# Patient Record
Sex: FEMALE | Race: WHITE | NOT HISPANIC OR LATINO | Employment: FULL TIME | URBAN - METROPOLITAN AREA
[De-identification: names, ages, dates, MRNs, and addresses within clinical notes are randomized per-mention and may not be internally consistent; named-entity substitution may affect disease eponyms.]

---

## 2017-12-26 ENCOUNTER — CONVERSION ENCOUNTER (OUTPATIENT)
Dept: OTHER | Facility: HOSPITAL | Age: 52
End: 2017-12-26

## 2018-03-26 ENCOUNTER — OFFICE VISIT CONVERTED (OUTPATIENT)
Dept: FAMILY MEDICINE CLINIC | Age: 53
End: 2018-03-26
Attending: NURSE PRACTITIONER

## 2018-05-03 ENCOUNTER — OFFICE VISIT CONVERTED (OUTPATIENT)
Dept: FAMILY MEDICINE CLINIC | Age: 53
End: 2018-05-03
Attending: FAMILY MEDICINE

## 2018-07-12 ENCOUNTER — OFFICE VISIT (OUTPATIENT)
Dept: ORTHOPEDIC SURGERY | Facility: CLINIC | Age: 53
End: 2018-07-12

## 2018-07-12 DIAGNOSIS — M25.562 LEFT KNEE PAIN, UNSPECIFIED CHRONICITY: Primary | ICD-10-CM

## 2018-07-12 DIAGNOSIS — M17.12 PRIMARY OSTEOARTHRITIS OF LEFT KNEE: ICD-10-CM

## 2018-07-12 PROCEDURE — 99203 OFFICE O/P NEW LOW 30 MIN: CPT | Performed by: ORTHOPAEDIC SURGERY

## 2018-07-12 RX ORDER — DICLOFENAC SODIUM 75 MG/1
TABLET, DELAYED RELEASE ORAL
COMMUNITY
Start: 2018-05-03 | End: 2021-06-09

## 2018-07-12 RX ORDER — HYDROCHLOROTHIAZIDE 25 MG/1
TABLET ORAL
COMMUNITY
Start: 2018-04-24 | End: 2021-06-09

## 2018-07-12 NOTE — PROGRESS NOTES
Chief Complaint   Patient presents with   • Left Knee - Establish Care   Patient is here today complaining of left knee pain for two months. There was no injury or trauma that she remembers.           HPI the patient has been having pain and discomfort on the medial aspect of the left knee for about 2 months.  There is no history of trauma.  Her symptoms are worst when she is climbing up and down the steps.  She has a dull aching sensation  which is associated with a click and a pop on the medial aspect of the knee.  Her symptoms are worse when she is walking briskly or on an inclined surface.  She states that occasionally the knee will buckle and give out from underneath her.  She has been using anti-inflammatory medication which is helpful in managing her symptoms of the knee pain to some degree.  She does have difficulty in squatting on the ground because of a sense of tightness of the knee.  There is no buckling or giving out of the knee at this point.          Allergies no known allergies      Social History     Social History   • Marital status:      Spouse name: N/A   • Number of children: N/A   • Years of education: N/A     Occupational History   • Not on file.     Social History Main Topics   • Smoking status: Not on file   • Smokeless tobacco: Not on file   • Alcohol use Not on file   • Drug use: Unknown   • Sexual activity: Not on file     Other Topics Concern   • Not on file     Social History Narrative   • No narrative on file       No family history on file.    No past surgical history on file.    No past medical history on file.        There were no vitals filed for this visit.          Review of Systems   Constitutional: Negative.    HENT: Negative.    Eyes: Negative.    Respiratory: Negative.    Cardiovascular: Negative.    Gastrointestinal: Negative.    Endocrine: Negative.    Genitourinary: Negative.    Musculoskeletal: Positive for gait problem and joint swelling.   Skin: Negative.     Allergic/Immunologic: Negative.    Hematological: Negative.            Physical Exam   Constitutional: She is oriented to person, place, and time. She appears well-nourished.   HENT:   Head: Atraumatic.   Eyes: EOM are normal.   Neck: Neck supple.   Cardiovascular: Intact distal pulses.    Pulmonary/Chest: Breath sounds normal.   Abdominal: Bowel sounds are normal.   Musculoskeletal: She exhibits edema and tenderness.   Neurological: She is alert and oriented to person, place, and time.   Skin: Skin is dry. Capillary refill takes 2 to 3 seconds.   Psychiatric: She has a normal mood and affect. Her behavior is normal. Judgment and thought content normal.   Nursing note and vitals reviewed.              Joint/Body Part Specific Exam:  left knee. Patient has crepitus throughout range of motion. Positive patellar grind test. Mild effusion. Lachman is negative. Pivot shift is negative. Anterior and posterior drawer signs are negative. Significant joint line tenderness is noted on the medial aspect of the knee. Patient has a varus orientation of the knee. There is fullness and tenderness in the Popliteal fossa. Mild distention of a Popliteal cyst is noted in this location. Range of motion in flexion is from 0- 120° degrees. Neurovascular status is intact.  Dorsalis pedis and posterior tibial artery pulses are palpable. Common peroneal nerve function is well preserved. Patient's gait is cautious and antalgic. Skin and soft tissues are mildly swollen, consistent with synovitis and effusion. The patient has a significant limp with the first few steps after starting the gait cycle. Getting out of a chair takes a lot of effort due to pain on knee flexion.          X-RAY Report:  X-rays of the knee AP and lateral images were reviewed.  There is a significant narrowing of the medial joint space.  Osteophyte formation is noted.  The findings do not reflect bone on bone appearance just yet.  There is some decrease of the  patellofemoral distance is well          Diagnostics: MRI reports are discussed with the patient.  These reports are available from the hospital and the images show degenerative change involving all the 3 compartments of the knee.  There is chondromalacia of the lateral side greater than the medial side of the patella.  There is some meniscal degeneration but no tears.  The ACL and the MCL are intact.  The quad and the patellar tendons are intact as well.  There is a small joint effusion.  Based on these MRI images by recommendations are for nonoperative care for this patient at this point in time.            Iman was seen today for establish care.    Diagnoses and all orders for this visit:    Left knee pain, unspecified chronicity  -     Ambulatory Referral to Physical Therapy Evaluate and treat    Primary osteoarthritis of left knee            Procedures          I provided this patient with educational materials regarding bone health and cast or splint care.        Plan:   Use a supportive brace on the knee to prevent it from buckling and giving out.    Tablet ibuprofen 600 mg orally twice a day for pain swelling and discomfort.    Calcium and vitamin D for bone health.    Intra-articular steroid injection and Synvisc Visco supplementation discussed with the patient at length and she understands and will let me know if she would like to proceed with that modality of care.    Note for outpatient physical therapy given to the patient to work on her quads and her hamstring muscles.    Use diclofenac 75 mg tab 1 by mouth twice a day for pain swelling and discomfort.    There is no indication for surgical intervention at this point because she has degenerative changes in the meniscus but not a tear.  She is also too young to consider knee replacement surgery at this point and therefore nonoperative management has been discussed with the patient in detail.    Follow-up in my office in 3 months for  reevaluation.        CC To Tiffani Arango MD

## 2018-07-22 PROBLEM — M25.562 LEFT KNEE PAIN: Status: ACTIVE | Noted: 2018-07-22

## 2019-01-07 ENCOUNTER — OFFICE VISIT CONVERTED (OUTPATIENT)
Dept: FAMILY MEDICINE CLINIC | Age: 54
End: 2019-01-07
Attending: NURSE PRACTITIONER

## 2019-01-21 ENCOUNTER — HOSPITAL ENCOUNTER (OUTPATIENT)
Dept: OTHER | Facility: HOSPITAL | Age: 54
Discharge: HOME | End: 2019-01-21
Attending: NURSE PRACTITIONER

## 2019-01-21 LAB
25(OH)D3 SERPL-MCNC: 32.9 NG/ML (ref 30–100)
ALBUMIN SERPL-MCNC: 3.5 G/DL (ref 3.5–5)
ALBUMIN/GLOB SERPL: 0.8 {RATIO} (ref 1.4–2.6)
ALP SERPL-CCNC: 105 U/L (ref 53–141)
ALT SERPL-CCNC: 14 U/L (ref 10–40)
ANION GAP SERPL CALC-SCNC: 15 MMOL/L (ref 8–19)
AST SERPL-CCNC: 15 U/L (ref 15–50)
BASOPHILS # BLD MANUAL: 0.05 10*3/UL (ref 0–0.2)
BASOPHILS NFR BLD MANUAL: 0.8 % (ref 0–3)
BILIRUB SERPL-MCNC: 0.54 MG/DL (ref 0.2–1.3)
BUN SERPL-MCNC: 14 MG/DL (ref 5–25)
BUN/CREAT SERPL: 15 {RATIO} (ref 6–20)
CALCIUM SERPL-MCNC: 9.6 MG/DL (ref 8.7–10.4)
CHLORIDE SERPL-SCNC: 101 MMOL/L (ref 99–111)
CHOLEST SERPL-MCNC: 157 MG/DL (ref 107–200)
CHOLEST/HDLC SERPL: 3.3 {RATIO} (ref 3–6)
CONV CO2: 27 MMOL/L (ref 22–32)
CONV TOTAL PROTEIN: 7.8 G/DL (ref 6.3–8.2)
CREAT UR-MCNC: 0.91 MG/DL (ref 0.5–0.9)
DEPRECATED RDW RBC AUTO: 41.6 FL
EOSINOPHIL # BLD MANUAL: 0.14 10*3/UL (ref 0–0.7)
EOSINOPHIL NFR BLD MANUAL: 2.3 % (ref 0–7)
ERYTHROCYTE [DISTWIDTH] IN BLOOD BY AUTOMATED COUNT: 13 % (ref 11.5–14.5)
GFR SERPLBLD BASED ON 1.73 SQ M-ARVRAT: >60 ML/MIN/{1.73_M2}
GLOBULIN UR ELPH-MCNC: 4.3 G/DL (ref 2–3.5)
GLUCOSE SERPL-MCNC: 85 MG/DL (ref 65–99)
GRANS (ABSOLUTE): 3.93 10*3/UL (ref 2–8)
GRANS: 63.5 % (ref 30–85)
HBA1C MFR BLD: 11.5 G/DL (ref 12–16)
HCT VFR BLD AUTO: 36.2 % (ref 37–47)
HDLC SERPL-MCNC: 48 MG/DL (ref 40–60)
IMM GRANULOCYTES # BLD: 0.01 10*3/UL (ref 0–0.54)
IMM GRANULOCYTES NFR BLD: 0.2 % (ref 0–0.43)
IRON SATN MFR SERPL: 19 % (ref 20–55)
IRON SERPL-MCNC: 66 UG/DL (ref 60–170)
LDLC SERPL CALC-MCNC: 91 MG/DL (ref 70–100)
LYMPHOCYTES # BLD MANUAL: 1.75 10*3/UL (ref 1–5)
LYMPHOCYTES NFR BLD MANUAL: 4.9 % (ref 3–10)
MCH RBC QN AUTO: 27.7 PG (ref 27–31)
MCHC RBC AUTO-ENTMCNC: 31.8 G/DL (ref 33–37)
MCV RBC AUTO: 87.2 FL (ref 81–99)
MONOCYTES # BLD AUTO: 0.3 10*3/UL (ref 0.2–1.2)
OSMOLALITY SERPL CALC.SUM OF ELEC: 288 MOSM/KG (ref 273–304)
PLATELET # BLD AUTO: 241 10*3/UL (ref 130–400)
PMV BLD AUTO: 9.8 FL (ref 7.4–10.4)
POTASSIUM SERPL-SCNC: 3.7 MMOL/L (ref 3.5–5.3)
RBC # BLD AUTO: 4.15 10*6/UL (ref 4.2–5.4)
SODIUM SERPL-SCNC: 139 MMOL/L (ref 135–147)
TIBC SERPL-MCNC: 343 UG/DL (ref 245–450)
TRANSFERRIN SERPL-MCNC: 240 MG/DL (ref 250–380)
TRIGL SERPL-MCNC: 90 MG/DL (ref 40–150)
TSH SERPL-ACNC: 2.02 M[IU]/L (ref 0.27–4.2)
VARIANT LYMPHS NFR BLD MANUAL: 28.3 % (ref 20–45)
VIT B12 SERPL-MCNC: 260 PG/ML (ref 211–911)
VLDLC SERPL-MCNC: 18 MG/DL (ref 5–37)
WBC # BLD AUTO: 6.18 10*3/UL (ref 4.8–10.8)

## 2019-10-21 ENCOUNTER — OFFICE VISIT CONVERTED (OUTPATIENT)
Dept: FAMILY MEDICINE CLINIC | Age: 54
End: 2019-10-21
Attending: NURSE PRACTITIONER

## 2019-12-11 ENCOUNTER — OFFICE VISIT CONVERTED (OUTPATIENT)
Dept: FAMILY MEDICINE CLINIC | Age: 54
End: 2019-12-11
Attending: NURSE PRACTITIONER

## 2019-12-11 ENCOUNTER — HOSPITAL ENCOUNTER (OUTPATIENT)
Dept: OTHER | Facility: HOSPITAL | Age: 54
Discharge: HOME | End: 2019-12-11
Attending: NURSE PRACTITIONER

## 2019-12-11 ENCOUNTER — HOSPITAL ENCOUNTER (OUTPATIENT)
Dept: INFUSION THERAPY | Facility: HOSPITAL | Age: 54
Setting detail: RECURRING SERIES
Discharge: HOME | End: 2019-12-31
Attending: OBSTETRICS & GYNECOLOGY

## 2019-12-11 LAB
ANION GAP SERPL CALC-SCNC: 20 MMOL/L (ref 8–19)
BASOPHILS # BLD AUTO: 0.06 10*3/UL (ref 0–0.2)
BASOPHILS NFR BLD AUTO: 0.6 % (ref 0–3)
BUN SERPL-MCNC: 16 MG/DL (ref 5–25)
BUN/CREAT SERPL: 8 {RATIO} (ref 6–20)
CALCIUM SERPL-MCNC: 9.7 MG/DL (ref 8.7–10.4)
CHLORIDE SERPL-SCNC: 100 MMOL/L (ref 99–111)
CONV ABS IMM GRAN: 0.15 10*3/UL (ref 0–0.2)
CONV CO2: 23 MMOL/L (ref 22–32)
CONV IMMATURE GRAN: 1.4 % (ref 0–1.8)
CREAT UR-MCNC: 1.99 MG/DL (ref 0.5–0.9)
DEPRECATED RDW RBC AUTO: 42.2 FL (ref 36.4–46.3)
EOSINOPHIL # BLD AUTO: 0.21 10*3/UL (ref 0–0.7)
EOSINOPHIL # BLD AUTO: 1.9 % (ref 0–7)
ERYTHROCYTE [DISTWIDTH] IN BLOOD BY AUTOMATED COUNT: 12.5 % (ref 11.7–14.4)
GFR SERPLBLD BASED ON 1.73 SQ M-ARVRAT: 32 ML/MIN/{1.73_M2}
GLUCOSE SERPL-MCNC: 104 MG/DL (ref 65–99)
HCT VFR BLD AUTO: 29.9 % (ref 37–47)
HGB BLD-MCNC: 9.3 G/DL (ref 12–16)
LYMPHOCYTES # BLD AUTO: 1.16 10*3/UL (ref 1–5)
LYMPHOCYTES NFR BLD AUTO: 10.7 % (ref 20–45)
MCH RBC QN AUTO: 28.9 PG (ref 27–31)
MCHC RBC AUTO-ENTMCNC: 31.1 G/DL (ref 33–37)
MCV RBC AUTO: 92.9 FL (ref 81–99)
MONOCYTES # BLD AUTO: 0.54 10*3/UL (ref 0.2–1.2)
MONOCYTES NFR BLD AUTO: 5 % (ref 3–10)
NEUTROPHILS # BLD AUTO: 8.73 10*3/UL (ref 2–8)
NEUTROPHILS NFR BLD AUTO: 80.4 % (ref 30–85)
NRBC CBCN: 0 % (ref 0–0.7)
OSMOLALITY SERPL CALC.SUM OF ELEC: 289 MOSM/KG (ref 273–304)
PLATELET # BLD AUTO: 520 10*3/UL (ref 130–400)
PMV BLD AUTO: 10 FL (ref 9.4–12.3)
POTASSIUM SERPL-SCNC: 4.2 MMOL/L (ref 3.5–5.3)
RBC # BLD AUTO: 3.22 10*6/UL (ref 4.2–5.4)
SODIUM SERPL-SCNC: 139 MMOL/L (ref 135–147)
WBC # BLD AUTO: 10.85 10*3/UL (ref 4.8–10.8)

## 2019-12-12 ENCOUNTER — HOSPITAL ENCOUNTER (OUTPATIENT)
Dept: OTHER | Facility: HOSPITAL | Age: 54
Discharge: HOME | End: 2019-12-12
Attending: NURSE PRACTITIONER

## 2019-12-12 LAB
D DIMER PPP FEU-MCNC: 1.37 MG/L (ref 0–0.59)
IRON SATN MFR SERPL: 12 % (ref 20–55)
IRON SERPL-MCNC: 26 UG/DL (ref 60–170)
TIBC SERPL-MCNC: 219 UG/DL (ref 245–450)
TRANSFERRIN SERPL-MCNC: 153 MG/DL (ref 250–380)

## 2019-12-13 ENCOUNTER — HOSPITAL ENCOUNTER (OUTPATIENT)
Dept: GENERAL RADIOLOGY | Facility: HOSPITAL | Age: 54
Discharge: HOME | End: 2019-12-13
Attending: NURSE PRACTITIONER

## 2019-12-13 LAB
CREAT BLD-MCNC: 2 MG/DL (ref 0.6–1.4)
GFR SERPLBLD BASED ON 1.73 SQ M-ARVRAT: 32 ML/MIN/{1.73_M2}

## 2019-12-19 ENCOUNTER — OFFICE VISIT CONVERTED (OUTPATIENT)
Dept: FAMILY MEDICINE CLINIC | Age: 54
End: 2019-12-19
Attending: FAMILY MEDICINE

## 2019-12-19 ENCOUNTER — HOSPITAL ENCOUNTER (OUTPATIENT)
Dept: OTHER | Facility: HOSPITAL | Age: 54
Discharge: HOME | End: 2019-12-19
Attending: FAMILY MEDICINE

## 2019-12-19 LAB
ANION GAP SERPL CALC-SCNC: 22 MMOL/L (ref 8–19)
BUN SERPL-MCNC: 31 MG/DL (ref 5–25)
BUN/CREAT SERPL: 17 {RATIO} (ref 6–20)
CALCIUM SERPL-MCNC: 10.1 MG/DL (ref 8.7–10.4)
CHLORIDE SERPL-SCNC: 97 MMOL/L (ref 99–111)
CONV CO2: 23 MMOL/L (ref 22–32)
CREAT UR-MCNC: 1.85 MG/DL (ref 0.5–0.9)
ERYTHROCYTE [DISTWIDTH] IN BLOOD BY AUTOMATED COUNT: 12.7 % (ref 11.7–14.4)
GFR SERPLBLD BASED ON 1.73 SQ M-ARVRAT: 35 ML/MIN/{1.73_M2}
GLUCOSE SERPL-MCNC: 97 MG/DL (ref 65–99)
HCT VFR BLD AUTO: 32.4 % (ref 37–47)
HGB BLD-MCNC: 10.2 G/DL (ref 12–16)
MCH RBC QN AUTO: 28.7 PG (ref 27–31)
MCHC RBC AUTO-ENTMCNC: 31.5 G/DL (ref 33–37)
MCV RBC AUTO: 91 FL (ref 81–99)
OSMOLALITY SERPL CALC.SUM OF ELEC: 292 MOSM/KG (ref 273–304)
PLATELET # BLD AUTO: 534 10*3/UL (ref 130–400)
PMV BLD AUTO: 9.3 FL (ref 9.4–12.3)
POTASSIUM SERPL-SCNC: 3.5 MMOL/L (ref 3.5–5.3)
RBC # BLD AUTO: 3.56 10*6/UL (ref 4.2–5.4)
SODIUM SERPL-SCNC: 138 MMOL/L (ref 135–147)
WBC # BLD AUTO: 11.76 10*3/UL (ref 4.8–10.8)

## 2019-12-23 ENCOUNTER — HOSPITAL ENCOUNTER (OUTPATIENT)
Dept: OTHER | Facility: HOSPITAL | Age: 54
Discharge: HOME | End: 2019-12-23
Attending: FAMILY MEDICINE

## 2019-12-23 LAB
APPEARANCE UR: ABNORMAL
BACTERIA UR CULT: NORMAL
BACTERIA UR QL AUTO: ABNORMAL
BILIRUB UR QL: NEGATIVE
CASTS URNS QL MICRO: ABNORMAL /[LPF]
COLOR UR: YELLOW
CONV LEUKOCYTE ESTERASE: ABNORMAL
CONV UROBILINOGEN IN URINE BY AUTOMATED TEST STRIP: 0.2 {EHRLICHU}/DL (ref 0.1–1)
EPI CELLS #/AREA URNS HPF: ABNORMAL /[HPF]
ERYTHROCYTE [DISTWIDTH] IN BLOOD BY AUTOMATED COUNT: 12.7 % (ref 11.5–14.5)
GLUCOSE 24H UR-MCNC: NEGATIVE MG/DL
HBA1C MFR BLD: 10 G/DL (ref 12–16)
HCT VFR BLD AUTO: 31.2 % (ref 37–47)
HGB UR QL STRIP: ABNORMAL
KETONES UR QL STRIP: NEGATIVE MG/DL
MCH RBC QN AUTO: 28.8 PG (ref 27–31)
MCHC RBC AUTO-ENTMCNC: 32.1 G/DL (ref 33–37)
MCV RBC AUTO: 89.9 FL (ref 81–99)
MUCOUS THREADS URNS QL MICRO: ABNORMAL
NITRITE UR-MCNC: NEGATIVE MG/ML
PH UR STRIP.AUTO: 5.5 [PH] (ref 5–8)
PLATELET # BLD AUTO: 437 10*3/UL (ref 130–400)
PMV BLD AUTO: 9.6 FL (ref 7.4–10.4)
PROT UR-MCNC: NEGATIVE MG/DL
RBC # BLD AUTO: 3.47 10*6/UL (ref 4.2–5.4)
RBC # BLD AUTO: ABNORMAL /[HPF]
SP GR UR STRIP: 1.01 (ref 1–1.03)
SPECIMEN SOURCE: ABNORMAL
UNIDENT CRYS URNS QL MICRO: ABNORMAL /[HPF]
WBC # BLD AUTO: 12.56 10*3/UL (ref 4.8–10.8)
WBC #/AREA URNS HPF: ABNORMAL /[HPF]

## 2019-12-25 LAB
AMOXICILLIN+CLAV SUSC ISLT: 8
AMPICILLIN SUSC ISLT: >=32
AMPICILLIN+SULBAC SUSC ISLT: >=32
BACTERIA UR CULT: ABNORMAL
CEFAZOLIN SUSC ISLT: <=4
CEFEPIME SUSC ISLT: <=1
CEFTAZIDIME SUSC ISLT: <=1
CEFTRIAXONE SUSC ISLT: <=1
CEFUROXIME ORAL SUSC ISLT: 32
CEFUROXIME PARENTER SUSC ISLT: 32
CIPROFLOXACIN SUSC ISLT: <=0.25
ERTAPENEM SUSC ISLT: <=0.5
GENTAMICIN SUSC ISLT: <=1
LEVOFLOXACIN SUSC ISLT: 1
NITROFURANTOIN SUSC ISLT: 128
TETRACYCLINE SUSC ISLT: >=16
TMP SMX SUSC ISLT: <=20
TOBRAMYCIN SUSC ISLT: <=1

## 2020-01-01 ENCOUNTER — HOSPITAL ENCOUNTER (OUTPATIENT)
Dept: INFUSION THERAPY | Facility: HOSPITAL | Age: 55
Setting detail: RECURRING SERIES
Discharge: HOME | End: 2020-01-31
Attending: OBSTETRICS & GYNECOLOGY

## 2020-01-08 ENCOUNTER — TRANSCRIBE ORDERS (OUTPATIENT)
Dept: ADMINISTRATIVE | Facility: HOSPITAL | Age: 55
End: 2020-01-08

## 2020-01-08 DIAGNOSIS — R91.1 LUNG NODULE: Primary | ICD-10-CM

## 2020-01-14 ENCOUNTER — HOSPITAL ENCOUNTER (OUTPATIENT)
Dept: PET IMAGING | Facility: HOSPITAL | Age: 55
Discharge: HOME | End: 2020-01-14
Admitting: INTERNAL MEDICINE

## 2020-01-14 ENCOUNTER — HOSPITAL ENCOUNTER (OUTPATIENT)
Dept: PET IMAGING | Facility: HOSPITAL | Age: 55
Discharge: HOME | End: 2020-01-14

## 2020-01-14 DIAGNOSIS — R91.1 LUNG NODULE: ICD-10-CM

## 2020-01-14 LAB — GLUCOSE BLDC GLUCOMTR-MCNC: 112 MG/DL (ref 70–130)

## 2020-01-14 PROCEDURE — A9552 F18 FDG: HCPCS | Performed by: INTERNAL MEDICINE

## 2020-01-14 PROCEDURE — 78815 PET IMAGE W/CT SKULL-THIGH: CPT

## 2020-01-14 PROCEDURE — 0 FLUDEOXYGLUCOSE F18 SOLUTION: Performed by: INTERNAL MEDICINE

## 2020-01-14 PROCEDURE — 82962 GLUCOSE BLOOD TEST: CPT

## 2020-01-14 RX ADMIN — FLUDEOXYGLUCOSE F18 1 DOSE: 300 INJECTION INTRAVENOUS at 07:15

## 2020-01-30 ENCOUNTER — HOSPITAL ENCOUNTER (OUTPATIENT)
Dept: OTHER | Facility: HOSPITAL | Age: 55
Discharge: HOME | End: 2020-01-30
Attending: FAMILY MEDICINE

## 2020-02-11 ENCOUNTER — OFFICE VISIT CONVERTED (OUTPATIENT)
Dept: FAMILY MEDICINE CLINIC | Age: 55
End: 2020-02-11
Attending: FAMILY MEDICINE

## 2020-04-03 ENCOUNTER — OFFICE VISIT CONVERTED (OUTPATIENT)
Dept: FAMILY MEDICINE CLINIC | Age: 55
End: 2020-04-03
Attending: FAMILY MEDICINE

## 2020-04-04 ENCOUNTER — HOSPITAL ENCOUNTER (OUTPATIENT)
Dept: OTHER | Facility: HOSPITAL | Age: 55
Discharge: HOME | End: 2020-04-04
Attending: INTERNAL MEDICINE

## 2020-04-04 LAB
ALBUMIN SERPL-MCNC: 3.8 G/DL (ref 3.5–5)
ANION GAP SERPL CALC-SCNC: 15 MMOL/L (ref 8–19)
APPEARANCE UR: CLEAR
BACTERIA UR QL AUTO: ABNORMAL
BILIRUB UR QL: NEGATIVE
BUN SERPL-MCNC: 13 MG/DL (ref 5–25)
BUN/CREAT SERPL: 13 {RATIO} (ref 6–20)
CALCIUM SERPL-MCNC: 9.9 MG/DL (ref 8.7–10.4)
CASTS URNS QL MICRO: ABNORMAL /[LPF]
CHLORIDE SERPL-SCNC: 103 MMOL/L (ref 99–111)
COLOR UR: ABNORMAL
CONV CO2: 28 MMOL/L (ref 22–32)
CONV CREATININE URINE, RANDOM: 249 MG/DL (ref 10–300)
CONV LEUKOCYTE ESTERASE: NEGATIVE
CONV PROTEIN TO CREATININE RATIO (RANDOM URINE): 0.06 {RATIO} (ref 0–0.1)
CONV UROBILINOGEN IN URINE BY AUTOMATED TEST STRIP: 0.2 {EHRLICHU}/DL (ref 0.1–1)
CREAT UR-MCNC: 1.04 MG/DL (ref 0.5–0.9)
EPI CELLS #/AREA URNS HPF: ABNORMAL /[HPF]
ERYTHROCYTE [DISTWIDTH] IN BLOOD BY AUTOMATED COUNT: 12.9 % (ref 11.5–14.5)
GFR SERPLBLD BASED ON 1.73 SQ M-ARVRAT: >60 ML/MIN/{1.73_M2}
GLUCOSE 24H UR-MCNC: NEGATIVE MG/DL
GLUCOSE SERPL-MCNC: 113 MG/DL (ref 65–99)
HBA1C MFR BLD: 12.8 G/DL (ref 12–16)
HCT VFR BLD AUTO: 39.6 % (ref 37–47)
HGB UR QL STRIP: NEGATIVE
KETONES UR QL STRIP: NEGATIVE MG/DL
MCH RBC QN AUTO: 28.8 PG (ref 27–31)
MCHC RBC AUTO-ENTMCNC: 32.3 G/DL (ref 33–37)
MCV RBC AUTO: 89.2 FL (ref 81–99)
MUCOUS THREADS URNS QL MICRO: ABNORMAL
NITRITE UR-MCNC: NEGATIVE MG/ML
PH UR STRIP.AUTO: 6 [PH] (ref 5–8)
PHOSPHATE SERPL-MCNC: 2.7 MG/DL (ref 2.4–4.5)
PLATELET # BLD AUTO: 265 10*3/UL (ref 130–400)
PMV BLD AUTO: 9.8 FL (ref 7.4–10.4)
POTASSIUM SERPL-SCNC: 4.1 MMOL/L (ref 3.5–5.3)
PROT UR-MCNC: 15.5 MG/DL
PROT UR-MCNC: NEGATIVE MG/DL
RBC # BLD AUTO: 4.44 10*6/UL (ref 4.2–5.4)
RBC # BLD AUTO: ABNORMAL /[HPF]
SODIUM SERPL-SCNC: 142 MMOL/L (ref 135–147)
SP GR UR STRIP: 1.02 (ref 1–1.03)
SPECIMEN SOURCE: ABNORMAL
UNIDENT CRYS URNS QL MICRO: ABNORMAL /[HPF]
WBC # BLD AUTO: 8.89 10*3/UL (ref 4.8–10.8)
WBC #/AREA URNS HPF: ABNORMAL /[HPF]

## 2020-05-11 ENCOUNTER — OFFICE VISIT CONVERTED (OUTPATIENT)
Dept: FAMILY MEDICINE CLINIC | Age: 55
End: 2020-05-11
Attending: FAMILY MEDICINE

## 2020-05-23 ENCOUNTER — HOSPITAL ENCOUNTER (OUTPATIENT)
Dept: OTHER | Facility: HOSPITAL | Age: 55
Discharge: HOME | End: 2020-05-23
Attending: FAMILY MEDICINE

## 2020-05-23 LAB
25(OH)D3 SERPL-MCNC: 33.3 NG/ML (ref 30–100)
ANION GAP SERPL CALC-SCNC: 13 MMOL/L (ref 8–19)
BNP SERPL-MCNC: 15 PG/ML (ref 0–900)
BUN SERPL-MCNC: 22 MG/DL (ref 5–25)
BUN/CREAT SERPL: 23 {RATIO} (ref 6–20)
CALCIUM SERPL-MCNC: 9.6 MG/DL (ref 8.7–10.4)
CHLORIDE SERPL-SCNC: 100 MMOL/L (ref 99–111)
CHOLEST SERPL-MCNC: 146 MG/DL (ref 107–200)
CHOLEST/HDLC SERPL: 3.2 {RATIO} (ref 3–6)
CONV CO2: 29 MMOL/L (ref 22–32)
CREAT UR-MCNC: 0.96 MG/DL (ref 0.5–0.9)
EST. AVERAGE GLUCOSE BLD GHB EST-MCNC: 108 MG/DL
GFR SERPLBLD BASED ON 1.73 SQ M-ARVRAT: >60 ML/MIN/{1.73_M2}
GLUCOSE SERPL-MCNC: 113 MG/DL (ref 65–99)
HBA1C MFR BLD: 5.4 % (ref 3.5–5.7)
HDLC SERPL-MCNC: 46 MG/DL (ref 40–60)
LDLC SERPL CALC-MCNC: 85 MG/DL (ref 70–100)
OSMOLALITY SERPL CALC.SUM OF ELEC: 292 MOSM/KG (ref 273–304)
POTASSIUM SERPL-SCNC: 3.4 MMOL/L (ref 3.5–5.3)
SODIUM SERPL-SCNC: 139 MMOL/L (ref 135–147)
T4 FREE SERPL-MCNC: 1.2 NG/DL (ref 0.9–1.8)
TRIGL SERPL-MCNC: 73 MG/DL (ref 40–150)
TSH SERPL-ACNC: 2.07 M[IU]/L (ref 0.27–4.2)
VLDLC SERPL-MCNC: 15 MG/DL (ref 5–37)

## 2020-05-27 ENCOUNTER — OFFICE VISIT CONVERTED (OUTPATIENT)
Dept: FAMILY MEDICINE CLINIC | Age: 55
End: 2020-05-27
Attending: FAMILY MEDICINE

## 2020-06-19 ENCOUNTER — HOSPITAL ENCOUNTER (OUTPATIENT)
Dept: PHYSICAL THERAPY | Facility: CLINIC | Age: 55
Setting detail: RECURRING SERIES
Discharge: HOME | End: 2020-09-14
Attending: FAMILY MEDICINE

## 2020-09-01 ENCOUNTER — HOSPITAL ENCOUNTER (OUTPATIENT)
Dept: OTHER | Facility: HOSPITAL | Age: 55
Discharge: HOME | End: 2020-09-01
Attending: FAMILY MEDICINE

## 2020-09-01 ENCOUNTER — OFFICE VISIT CONVERTED (OUTPATIENT)
Dept: FAMILY MEDICINE CLINIC | Age: 55
End: 2020-09-01
Attending: FAMILY MEDICINE

## 2020-09-01 LAB
ERYTHROCYTE [DISTWIDTH] IN BLOOD BY AUTOMATED COUNT: 12.5 % (ref 11.5–14.5)
HBA1C MFR BLD: 13.3 G/DL (ref 12–16)
HCT VFR BLD AUTO: 41.1 % (ref 37–47)
MCH RBC QN AUTO: 29.6 PG (ref 27–31)
MCHC RBC AUTO-ENTMCNC: 32.4 G/DL (ref 33–37)
MCV RBC AUTO: 91.3 FL (ref 81–99)
PLATELET # BLD AUTO: 268 10*3/UL (ref 130–400)
PMV BLD AUTO: 10.1 FL (ref 7.4–10.4)
RBC # BLD AUTO: 4.5 10*6/UL (ref 4.2–5.4)
VIT B12 SERPL-MCNC: 522 PG/ML (ref 211–911)
WBC # BLD AUTO: 7.54 10*3/UL (ref 4.8–10.8)

## 2020-09-02 LAB — IRON SERPL-MCNC: 65 UG/DL (ref 60–170)

## 2020-09-04 LAB — CONV GLIADIN PEPTIDE AB IGA: 5 UNITS (ref 0–19)

## 2020-12-01 ENCOUNTER — HOSPITAL ENCOUNTER (OUTPATIENT)
Dept: OTHER | Facility: HOSPITAL | Age: 55
Discharge: HOME | End: 2020-12-01
Attending: FAMILY MEDICINE

## 2020-12-01 ENCOUNTER — OFFICE VISIT CONVERTED (OUTPATIENT)
Dept: FAMILY MEDICINE CLINIC | Age: 55
End: 2020-12-01
Attending: FAMILY MEDICINE

## 2020-12-01 LAB
25(OH)D3 SERPL-MCNC: 31.7 NG/ML (ref 30–100)
ALBUMIN SERPL-MCNC: 4 G/DL (ref 3.5–5)
ALBUMIN/GLOB SERPL: 1.1 {RATIO} (ref 1.4–2.6)
ALP SERPL-CCNC: 117 U/L (ref 53–141)
ALT SERPL-CCNC: 28 U/L (ref 10–40)
ANION GAP SERPL CALC-SCNC: 14 MMOL/L (ref 8–19)
AST SERPL-CCNC: 25 U/L (ref 15–50)
BILIRUB SERPL-MCNC: 0.39 MG/DL (ref 0.2–1.3)
BUN SERPL-MCNC: 18 MG/DL (ref 5–25)
BUN/CREAT SERPL: 15 {RATIO} (ref 6–20)
CALCIUM SERPL-MCNC: 9.7 MG/DL (ref 8.7–10.4)
CHLORIDE SERPL-SCNC: 102 MMOL/L (ref 99–111)
CHOLEST SERPL-MCNC: 174 MG/DL (ref 107–200)
CHOLEST/HDLC SERPL: 3.6 {RATIO} (ref 3–6)
CONV CO2: 28 MMOL/L (ref 22–32)
CONV TOTAL PROTEIN: 7.7 G/DL (ref 6.3–8.2)
CREAT UR-MCNC: 1.24 MG/DL (ref 0.5–0.9)
ERYTHROCYTE [DISTWIDTH] IN BLOOD BY AUTOMATED COUNT: 12.5 % (ref 11.5–14.5)
EST. AVERAGE GLUCOSE BLD GHB EST-MCNC: 120 MG/DL
GFR SERPLBLD BASED ON 1.73 SQ M-ARVRAT: 56 ML/MIN/{1.73_M2}
GLOBULIN UR ELPH-MCNC: 3.7 G/DL (ref 2–3.5)
GLUCOSE SERPL-MCNC: 97 MG/DL (ref 65–99)
HBA1C MFR BLD: 13.2 G/DL (ref 12–16)
HBA1C MFR BLD: 5.8 % (ref 3.5–5.7)
HCT VFR BLD AUTO: 40.4 % (ref 37–47)
HDLC SERPL-MCNC: 49 MG/DL (ref 40–60)
IRON SERPL-MCNC: 63 UG/DL (ref 60–170)
LDLC SERPL CALC-MCNC: 103 MG/DL (ref 70–100)
MCH RBC QN AUTO: 30 PG (ref 27–31)
MCHC RBC AUTO-ENTMCNC: 32.7 G/DL (ref 33–37)
MCV RBC AUTO: 91.8 FL (ref 81–99)
OSMOLALITY SERPL CALC.SUM OF ELEC: 292 MOSM/KG (ref 273–304)
PLATELET # BLD AUTO: 276 10*3/UL (ref 130–400)
PMV BLD AUTO: 9.4 FL (ref 7.4–10.4)
POTASSIUM SERPL-SCNC: 4.2 MMOL/L (ref 3.5–5.3)
RBC # BLD AUTO: 4.4 10*6/UL (ref 4.2–5.4)
SODIUM SERPL-SCNC: 140 MMOL/L (ref 135–147)
TRIGL SERPL-MCNC: 112 MG/DL (ref 40–150)
VIT B12 SERPL-MCNC: 538 PG/ML (ref 211–911)
VLDLC SERPL-MCNC: 22 MG/DL (ref 5–37)
WBC # BLD AUTO: 7.93 10*3/UL (ref 4.8–10.8)

## 2021-03-25 ENCOUNTER — HOSPITAL ENCOUNTER (OUTPATIENT)
Dept: OTHER | Facility: HOSPITAL | Age: 56
Discharge: HOME | End: 2021-03-25
Attending: FAMILY MEDICINE

## 2021-04-09 ENCOUNTER — HOSPITAL ENCOUNTER (OUTPATIENT)
Dept: OTHER | Facility: HOSPITAL | Age: 56
Discharge: HOME | End: 2021-04-09
Attending: INTERNAL MEDICINE

## 2021-04-09 LAB
ALBUMIN SERPL-MCNC: 4 G/DL (ref 3.5–5)
ANION GAP SERPL CALC-SCNC: 18 MMOL/L (ref 8–19)
APPEARANCE UR: ABNORMAL
BACTERIA UR QL AUTO: ABNORMAL
BILIRUB UR QL: NEGATIVE
BUN SERPL-MCNC: 22 MG/DL (ref 5–25)
BUN/CREAT SERPL: 18 {RATIO} (ref 6–20)
CALCIUM SERPL-MCNC: 9.7 MG/DL (ref 8.7–10.4)
CASTS URNS QL MICRO: ABNORMAL /[LPF]
CHLORIDE SERPL-SCNC: 103 MMOL/L (ref 99–111)
COLOR UR: YELLOW
CONV CO2: 25 MMOL/L (ref 22–32)
CONV CREATININE URINE, RANDOM: 192.6 MG/DL (ref 10–300)
CONV LEUKOCYTE ESTERASE: NEGATIVE
CONV PROTEIN TO CREATININE RATIO (RANDOM URINE): 0.04 {RATIO} (ref 0–0.1)
CONV UROBILINOGEN IN URINE BY AUTOMATED TEST STRIP: 0.2 {EHRLICHU}/DL (ref 0.1–1)
CREAT UR-MCNC: 1.25 MG/DL (ref 0.5–0.9)
EPI CELLS #/AREA URNS HPF: ABNORMAL /[HPF]
ERYTHROCYTE [DISTWIDTH] IN BLOOD BY AUTOMATED COUNT: 12.7 % (ref 11.5–14.5)
GFR SERPLBLD BASED ON 1.73 SQ M-ARVRAT: 56 ML/MIN/{1.73_M2}
GLUCOSE 24H UR-MCNC: NEGATIVE MG/DL
GLUCOSE SERPL-MCNC: 122 MG/DL (ref 65–99)
HBA1C MFR BLD: 13.5 G/DL (ref 12–16)
HCT VFR BLD AUTO: 41.1 % (ref 37–47)
HGB UR QL STRIP: ABNORMAL
KETONES UR QL STRIP: NEGATIVE MG/DL
MCH RBC QN AUTO: 29.8 PG (ref 27–31)
MCHC RBC AUTO-ENTMCNC: 32.8 G/DL (ref 33–37)
MCV RBC AUTO: 90.7 FL (ref 81–99)
MUCOUS THREADS URNS QL MICRO: ABNORMAL
NITRITE UR-MCNC: NEGATIVE MG/ML
PH UR STRIP.AUTO: 5.5 [PH] (ref 5–8)
PHOSPHATE SERPL-MCNC: 3 MG/DL (ref 2.4–4.5)
PLATELET # BLD AUTO: 276 10*3/UL (ref 130–400)
PMV BLD AUTO: 9.9 FL (ref 7.4–10.4)
POTASSIUM SERPL-SCNC: 3.7 MMOL/L (ref 3.5–5.3)
PROT UR-MCNC: 8.5 MG/DL
PROT UR-MCNC: NEGATIVE MG/DL
RBC # BLD AUTO: 4.53 10*6/UL (ref 4.2–5.4)
RBC # BLD AUTO: ABNORMAL /[HPF]
SODIUM SERPL-SCNC: 142 MMOL/L (ref 135–147)
SP GR UR STRIP: >=1.03 (ref 1–1.03)
SPECIMEN SOURCE: ABNORMAL
UNIDENT CRYS URNS QL MICRO: ABNORMAL /[HPF]
WBC # BLD AUTO: 8.13 10*3/UL (ref 4.8–10.8)
WBC #/AREA URNS HPF: ABNORMAL /[HPF]

## 2021-04-13 ENCOUNTER — OFFICE VISIT CONVERTED (OUTPATIENT)
Dept: FAMILY MEDICINE CLINIC | Age: 56
End: 2021-04-13
Attending: FAMILY MEDICINE

## 2021-05-18 NOTE — PROGRESS NOTES
Iman WisemanVictor M 1965     Office/Outpatient Visit    Visit Date: Mon, Jan 7, 2019 01:49 pm    Provider: Tracey Dalton N.P. (Assistant: Cuauhtemoc Yusuf)    Location: [facility]        Electronically signed by Tracey Dalton N.P. on  01/07/2019 09:24:15 PM                             SUBJECTIVE:        CC:     Esther is a 53 year old Black or  female.  well woman exam;         HPI:         Esther presents with well Woman Exam.  She cannot recall when she last had a physical exam.  She is status-post hysterectomy.  She performs breast self-exams monthly.    Her last Pap smear was in 3/12/14 and was normal.   Her last mammogram was in 12/27/17 and was normal.   Her last DEXA was in 12/7/15 and was normal.   She underwent colonoscopy 2 years ago.   She's had vision screening done 2 years ago.   Preventative Health updated today.  She is not current with her influenza immunization.  Esther denies any history of abnormal Pap smears.  Tobacco: She has never smoked.              PHQ-9 Depression Screening: Completed form scanned and in chart; Total Score 0 Alcohol Consumption Screening: Completed form scanned and in chart; Total Score 0         With regard to the hypertension, her current cardiac medication regimen includes a diuretic ( HCTZ ).  She is tolerating the medication well without side effects.  Compliance with treatment has been good; she takes her medication as directed.      ROS:     CONSTITUTIONAL:  Positive for fatigue.      EYES:  Negative for blurred vision, eye pain, and photophobia.      E/N/T:  Negative for hearing problems, E/N/T pain, congestion, rhinorrhea, epistaxis, hoarseness, and dental problems.      CARDIOVASCULAR:  Negative for chest pain, palpitations, tachycardia, orthopnea, and edema.      RESPIRATORY:  Negative for cough, dyspnea, and hemoptysis.      GASTROINTESTINAL:  Negative for abdominal pain, heartburn, constipation, diarrhea, and stool changes.       GENITOURINARY:  Negative for genital lesions, hematuria, menstrual problems, polyuria, abnormal vaginal bleeding, and vaginal discharge.      MUSCULOSKELETAL:  Negative for arthralgias, back pain, and myalgias.      INTEGUMENTARY/BREAST:  Positive for performance of self breast exams ( on a monthly basis ).   Negative for breast mass.      NEUROLOGICAL:  Negative for dizziness, headaches, paresthesias, and weakness.      ENDOCRINE:  Negative for hair loss, heat/cold intolerance, polydipsia, and polyphagia.      PSYCHIATRIC:  Negative for anxiety, depression, and sleep disturbances.          PMH/FMH/SH:     Last Reviewed on 2019 02:23 PM by Tracey Dalton    Past Medical History:             PAST MEDICAL HISTORY             GYNECOLOGICAL HISTORY:        Problems with pregnancy have included gestational diabetes.          PREVENTIVE HEALTH MAINTENANCE             COLORECTAL CANCER SCREENING: Up to date (colonoscopy q10y; sigmoidoscopy q5y; Cologuard q3y) was last done , Results are in chart; The next colonoscopy is due           Surgical History:         Cholecystectomy: 2014;     : X 1;     Hysterectomy: Right oopherectomy;     Procedures:    Colonoscopy (  dr escobedo/polyp/ follow up due  )         Family History:     Father:  at age 77;  Cancer (unspecified type)     Mother: Hypertension;  Ovarian Cancer;  Type 2 Diabetes     Brother(s): 4 brother(s) total;  Hypertension     Sister(s): 3 sister(s) total;  Hypertension         Social History:     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  Non-drinker             Immunizations:     Hep B (adult dose) 2016     Hep A, adult dose 2016     Adacel (Tdap) 2012         Allergies:     Last Reviewed on 2019 01:54 PM by Paramjit  Cuauhtemoc WHEELER      No Known Drug Allergies.         Current Medications:     Last Reviewed on 1/07/2019 01:54 PM by Cuauhtemoc Yusuf    Hydrochlorothiazide (HCTZ) 25mg Tablet one a day     Vitamin D3 5,000IU Capsules 1 capsule every day     Iron 45 mg one tab daily         OBJECTIVE:        Vitals:         Current: 1/7/2019 1:57:45 PM    Ht:  5 ft, 5.75 in;  Wt: 235.4 lbs;  BMI: 38.3    T: 98.4 F (oral);  BP: 129/74 mm Hg (left arm, sitting);  P: 84 bpm (left arm (BP Cuff), sitting);  sCr: 0.83 mg/dL;  GFR: 95.94        Exams:     PHYSICAL EXAM:     GENERAL: vital signs recorded - well developed, well nourished;  no apparent distress;     EYES: extraocular movements intact; conjunctiva and cornea are normal; PERRLA;     E/N/T:  normal EACs, TMs, nasal/oral mucosa, teeth, gingiva, and oropharynx;     NECK: range of motion is normal; thyroid is non-palpable;     RESPIRATORY: normal respiratory rate and pattern with no distress; normal breath sounds with no rales, rhonchi, wheezes or rubs;     CARDIOVASCULAR: normal rate; rhythm is regular;  no systolic murmur; no edema;     GASTROINTESTINAL: nontender; normal bowel sounds; no organomegaly; rectal exam: normal tone; nontender, guaiac negative stool;     GENITOURINARY: external genitalia: normal without lesions or urethral abnormalities;;  vagina: normal with good pelvic support and no lesions or discharge;; cervix: absent (s/p hyst) noted;;  adnexa: normal with no masses or tenderness     LYMPHATIC: no enlargement of cervical or facial nodes; no axillary adenopathy;     BREAST/INTEGUMENT: BREASTS: breast exam is normal without masses, skin changes, or nipple discharge; SKIN: no significant rashes or lesions; no suspicious moles;     MUSCULOSKELETAL:  Normal range of motion, strength and tone;     NEUROLOGIC: GROSSLY INTACT     PSYCHIATRIC:  appropriate affect and demeanor; normal speech pattern; grossly normal memory;         Lab/Test Results:             Glucose, Urine:   Neg (01/07/2019),     Bilirubin, urine:  Negative (01/07/2019),     Ketones, Urine Strip:  Negative (01/07/2019),     Specific Gravity, urine:  1.025 (01/07/2019),     Blood in Urine:  trace (01/07/2019),     pH, urine:  5.5 (01/07/2019),     Protein Urine QL:  negative (01/07/2019),     Urobilinogen, urine:  0.2 E.U./dL (01/07/2019),     Nitrite, Urine:  Negative (01/07/2019),     Leukoctyes, urine:  Negative (01/07/2019),     Appearance:  Clear (01/07/2019),     collection source:  Clean-catch (01/07/2019),     Color:  Yellow (01/07/2019),     Performed by::  andreas (01/07/2019),             ASSESSMENT:           V72.31     Well Woman Exam     V79.0   Z13.89  Screening for depression              DDx:     401.1   I10  Hypertension              DDx:     780.79   R53.83  Fatigue              DDx:     V76.41   Z12.12  Screening for rectal cancer              DDx:         ORDERS:         Meds Prescribed:       Refill of: Hydrochlorothiazide (HCTZ) 25mg Tablet one a day  #90 (Ninety) tablet(s) Refills: 1         Radiology/Test Orders:       85728  Screening digital breast tomosynthesis bi  (Send-Out)           Lab Orders:       39953  Urinalysis, automated, without microscopy  (In-House)         FUTURE  Future order to be done at patients convenience  (Send-Out)         10731  HTNLP - [facility] CMP AND LIPID: [id number], [id number]  (Send-Out)         FUTURE  Future order to be done at patients convenience  (Send-Out)         91167  BDCBC - [facility] CBC with 3 part diff  (Send-Out)         23287  IRONP - [facility] Iron and TIBC  (Send-Out)         76554  VB12 - [facility] Vitamin B12  (Send-Out)         38814  TSH - [facility] TSH  (Send-Out)         98784  VITD - [facility] Vitamin D, 25 Hydroxy  (Send-Out)         63695  Occult blood, fecal  (In-House)           Other Orders:         Negative EtOH screen  (In-House)           Calculated BMI above the upper parameter and a follow-up plan was documented in the medical record  (In-House)                   PLAN:           Well Woman Exam pap deferred; declines flu vaccine, has had vaccines for hepatitis  A and B but only one we have on record, she will check with her work to see if she may of had the rest of the series elsewhere          RADIOLOGY:  I have ordered Screening 3D Mammogram Bilateral to be done today.  [facility]     BMI Elevated - Follow-Up Plan: She was provided education on weight loss strategies     COUNSELING was provided today regarding the following topics: healthy eating habits, regular exercise, and breast self-exam.            Orders:      80709  Urinalysis, automated, without microscopy  (In-House)         41110  Screening digital breast tomosynthesis bi  (Send-Out)           Calculated BMI above the upper parameter and a follow-up plan was documented in the medical record  (In-House)             Patient Education Handouts:       Hepatitis A           Screening for depression     [facility] PHQ-9 Depression Screening Completed form scanned and in chart; Total Score enter total score Negative alcohol screen           Orders:         Negative EtOH screen  (In-House)            Hypertension         FOLLOW-UP TESTING #1: FOLLOW-UP LABORATORY:  Labs to be scheduled in the future include HTN/Lipid Panel: CMP, Lipid.            Prescriptions:       Refill of: Hydrochlorothiazide (HCTZ) 25mg Tablet one a day  #90 (Ninety) tablet(s) Refills: 1           Orders:       FUTURE  Future order to be done at patients convenience  (Send-Out)         85030  HTNLP - [facility] CMP AND LIPID: [id number], [id number]  (Send-Out)            Fatigue         FOLLOW-UP TESTING #1: FOLLOW-UP LABORATORY:  Labs to be scheduled in the future include Anemia profile CBC Serum iron Vitamin B12, TSH, and Vitamin D 25.            Orders:       FUTURE  Future order to be done at patients convenience  (Send-Out)         58717  BDCBC - [facility] CBC with 3 part diff  (Send-Out)         42763  IRONP - [facility] Iron and TIBC  (Send-Out)         73955  VB12 - [facility] Vitamin  B12  (Send-Out)         00147  TSH - [facility] TSH  (Send-Out)         33690  VITD - [facility] Vitamin D, 25 Hydroxy  (Send-Out)            Screening for rectal cancer           Orders:       11183  Occult blood, fecal  (In-House)   X 1 @ [facility]             Patient Recommendations:        For  Well Woman Exam:         Limit dietary intake of fat (especially saturated fat) and cholesterol.  Eat a variety of foods, including plenty of fruits, vegetables, and grain containg fiber, limit fat intake to 30% of total calories. Balance caloric intake with energy expended.    Maintaining regular physical activity is advised to help prevent heart disease, hypertension, diabetes, and obesity.    You should regularly examine your breasts, easily done while in the shower or with lotion.  Feel and look for differences in consistency from month to month, especially noting knots or lumps, changes in skin appearance, nipple retraction or discharge.          For  Hypertension:             The following laboratory testing has been ordered:         For  Fatigue:             The following laboratory testing has been ordered: TSH             CHARGE CAPTURE:           Primary Diagnosis:     V72.31    Well Woman Exam                Z01.419    Encounter for gynecological examination (general) (routine) without abnormal findings                       Orders:          07147   Preventive medicine, established patient, age 40-64 years  (In-House)             86286   Urinalysis, automated, without microscopy  (In-House)                Calculated BMI above the upper parameter and a follow-up plan was documented in the medical record  (In-House)           V79.0 Screening for depression            Z13.89    Encounter for screening for other disorder              Orders:             Negative EtOH screen  (In-House)           401.1 Hypertension            I10    Essential (primary) hypertension    780.79 Fatigue            R53.83    Other fatigue     V76.41 Screening for rectal cancer            Z12.12    Encounter for screening for malignant neoplasm of rectum              Orders:          09432   Occult blood, fecal  (In-House)               ADDENDUMS:      ____________________________________    Date: 01/22/2019 03:10 PM    Author: Marilyn Roberts         Visit Note Faxed to:        Amarjit Alamo (Oncology); Number [phone]

## 2021-05-18 NOTE — PROGRESS NOTES
Iman Wiseman  1965     Office/Outpatient Visit    Visit Date: Tue, Apr 13, 2021 08:50 am    Provider: Tiffani Arango MD (Assistant: Erin Phillips MA)    Location: [facility]        Electronically signed by Tiffani Arango MD on  04/22/2021 09:39:42 AM                             Subjective:        CC: kidney failure and BP follow up facetime [phone]    HPI:           Esther presents with essential (primary) hypertension.  Current nonpharmacologic treatment includes low sodium diet and exercise.  Her current cardiac medication regimen includes a diuretic ( HCTZ/triamterene ).  Compliance with treatment has been good; she takes her medication as directed, maintains her diet and exercise regimen, and follows up as directed.  Esther does not check her blood pressure other than at her clinic appointments.        she saw the nephrologist, labs are improving, no changes needed at this time, she is to avoid NSAIDS and PPIs        she was kneeling in [place] and tweeked her knee, went to the ER, xray showed L knee OA-she needs ortho referal, she is going to see Dr Oglesby    ROS:     CONSTITUTIONAL:  Negative for chills, fatigue and fever.      E/N/T:  Negative for hearing problems, E/N/T pain, congestion, rhinorrhea, epistaxis, hoarseness, and dental problems.      CARDIOVASCULAR:  Negative for chest pain and dizziness.      RESPIRATORY:  Negative for recent cough, dyspnea and pleuritic chest pain.      GASTROINTESTINAL:  Positive for diarrhea ( due to IBS-resolved with probiotic ).   Negative for abdominal pain, constipation, nausea or vomiting.      GENITOURINARY:  Negative for dysuria, nocturia, polyuria, urinary incontinence and vaginal discharge.      INTEGUMENTARY/BREAST:  Negative for rash.      NEUROLOGICAL:  Negative for dizziness and headaches.      PSYCHIATRIC:  Negative for anxiety, depression, sadness, sleep disturbance and suicidal thoughts.          Past Medical History /  Family History / Social History:         Last Reviewed on 2021 09:25 AM by Tiffani Arango    Past Medical History:             PAST MEDICAL HISTORY             GYNECOLOGICAL HISTORY:        Problems with pregnancy have included gestational diabetes.          PREVENTIVE HEALTH MAINTENANCE             COLORECTAL CANCER SCREENING: Up to date (colonoscopy q10y; sigmoidoscopy q5y; Cologuard q3y) was last done 19, Results are in chart; colonoscopy with the following abnormalities noted-- Polyp(s); The next colonoscopy is due       MAMMOGRAM: Done within last 2 years and results in are chart was last done 3/25/21 with normal results         Surgical History:         Cholecystectomy: 2014;     : X 1;     Hysterectomy: Right oopherectomy;     Procedures:    Colonoscopy (  )    EGD ( 19 )    Laparoscopy (  exp lap ) 2019 abdominal abscess drainage/ post op wound infection with wound vac         Family History:     Father:  at age 77;  Cancer (unspecified type)     Mother: Hypertension;  Ovarian Cancer;  Type 2 Diabetes     Brother(s): 4 brother(s) total;  Hypertension     Sister(s): 3 sister(s) total;  Hypertension         Social History:     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  Non-drinker         Substance Abuse History:     Last Reviewed on 2020 01:10 PM by Sandra Velasquez        Mental Health History:     Last Reviewed on 2020 01:10 PM by Sandra Velasquez        Communicable Diseases (eg STDs):     Last Reviewed on 2020 01:10 PM by Sandra Velasquez        Allergies:     Last Reviewed on 2020 11:31 AM by Erin Phillips    No Known Allergies.        Current Medications:     Last Reviewed on 2020 11:32 AM by Erin Phillips    multivitamin with b  complex     Probiotic     clonazePAM 0.5 mg oral tablet [take 1 tablet (0.5 mg) by oral route 2 times per day prn anxiety]    cholecalciferol (vitamin D3) 125 mcg (5,000 unit) oral tablet [2 pills once a week]    triamterene-hydrochlorothiazid 37.5-25 mg oral capsule [take 1 capsule by oral route once daily]        Objective:        Vitals:         Current: 4/13/2021 9:19:59 AM    Ht:  5 ft, 5.75 in;  Wt: 240 lbs;  BMI: 39.0T: 98 F (temporal);  BP: 122/78 mm Hg (left arm, sitting);  R: 16 bpm;  sCr: 1.24 mg/dL;  GFR: 62.57        Exams:     PHYSICAL EXAM:     GENERAL:  well developed and nourished; appropriately groomed; in no apparent distress;     EYES: PERRL, EOMI     NECK: supple, FROM;     RESPIRATORY: normal respiratory rate and pattern with no distress;     MUSCULOSKELETAL: normal gait;     NEUROLOGIC: mental status: alert and oriented x 3; GROSSLY INTACT     PSYCHIATRIC: appropriate affect and demeanor; normal psychomotor function; normal speech pattern; normal thought and perception;         Assessment:         I10   Essential (primary) hypertension       E55.9   Vitamin D deficiency, unspecified       K58.0   Irritable bowel syndrome with diarrhea       N18.2   Chronic kidney disease, stage 2 (mild)       M17.12   Unilateral primary osteoarthritis, left knee           ORDERS:         Meds Prescribed:       [Recorded] hydroCHLOROthiazide 25 mg oral tablet [take 1 tablet (25 mg) by oral route 2 times per day]       [Refilled] hydroCHLOROthiazide 25 mg oral tablet [take 1 tablet (25 mg) by oral route daily], #90 (ninety) tablets, Refills: 3 (three)         Lab Orders:       [id number]  VITD - [facility] Vitamin D, 25 Hydroxy  (Send-Out)                      Plan:         Essential (primary) hypertensionstable and well controlled on meds, she wants to stop triamterene due to SE and go back on hctz alone    Telehealth: Verbal consent obtained for visit to occur via televideo conferencing; Staff, other than provider,  present during telephone visit include only Dr Jacobson was present during the telehealth OV with patient           Prescriptions:       [Recorded] hydroCHLOROthiazide 25 mg oral tablet [take 1 tablet (25 mg) by oral route 2 times per day]       [Refilled] hydroCHLOROthiazide 25 mg oral tablet [take 1 tablet (25 mg) by oral route daily], #90 (ninety) tablets, Refills: 3 (three)         Vitamin D deficiency, unspecifiedstable on 5000 units a day    LABORATORY:  Labs ordered to be performed today include Vitamin D.            Orders:       [id number]  VITD - [facility] Vitamin D, 25 Hydroxy  (Send-Out)              Irritable bowel syndrome with diarrheastable, no acute issues        Chronic kidney disease, stage 2 (mild)she saw the nephrologist, labs are improving, no changes needed at this time, she is to avoid NSAIDS and PPIs                Unilateral primary osteoarthritis, left kneeshe was kneeling in [place] and tweeked her knee, went to the ER, xray showed L knee OA-she needs ortho referal, she is going to see Dr Oglesby            Charge Capture:         Primary Diagnosis:     I10  Essential (primary) hypertension           Orders:      [id number]  Office/outpatient visit; established patient, level 4  (In-House)              E55.9  Vitamin D deficiency, unspecified     K58.0  Irritable bowel syndrome with diarrhea     N18.2  Chronic kidney disease, stage 2 (mild)     M17.12  Unilateral primary osteoarthritis, left knee

## 2021-05-18 NOTE — PROGRESS NOTES
Iman Wiseman 1965     Office/Outpatient Visit    Visit Date: Mon, Mar 26, 2018 02:37 pm    Provider: Tracey Dalton N.P. (Assistant: Yoly Griffith MA)    Location: [facility]        Electronically signed by Tracey Dalton N.P. on  2018 04:19:43 PM                             SUBJECTIVE:        CC:     Esther is a 53 year old Black or African American female.  meds refill;         HPI:         Patient presents with hypertension.  Current nonpharmacologic treatment includes low sodium diet and exercise.  Her current cardiac medication regimen includes a diuretic ( HCTZ ).  Esther does not check her blood pressure other than at her clinic appointments.  Compliance with treatment has been good; she takes her medication as directed, maintains her diet and exercise regimen, and follows up as directed.      ROS:     CONSTITUTIONAL:  Negative for chills, fatigue, fever, and weight change.      CARDIOVASCULAR:  Negative for chest pain, palpitations, tachycardia, orthopnea, and edema.      RESPIRATORY:  Negative for cough, dyspnea, and hemoptysis.      MUSCULOSKELETAL:  Positive for arthralgias (left knee pain with flexion. No injury.).  X one month     NEUROLOGICAL:  Negative for dizziness, headaches, paresthesias, and weakness.          [facility]:     Last Reviewed on 3/26/2018 02:55 PM by Tracey Dalton    Past Medical History:             PAST MEDICAL HISTORY             GYNECOLOGICAL HISTORY:        Problems with pregnancy have included gestational diabetes.          PREVENTIVE HEALTH MAINTENANCE             COLORECTAL CANCER SCREENING: Up to date (colonoscopy q10y; sigmoidoscopy q5y; Cologuard q3y) was last done , Results are in chart; The next colonoscopy is due           Surgical History:         Cholecystectomy: 2014;     : X 1;     Hysterectomy: Right oopherectomy;     Procedures:    Colonoscopy (  dr escobedo/polyp/ follow up due  )         Family  History:     Father:  at age 77     Mother: Hypertension;  Ovarian Cancer;  Type 2 Diabetes     Brother(s): 4 brother(s) total;  Hypertension     Sister(s): 3 sister(s) total;  Hypertension         Social History:     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  Non-drinker             Immunizations:     Hep B (adult dose) 2016     Hep A, adult dose 2016     Adacel (Tdap) 2012         Allergies:     Last Reviewed on 3/26/2018 02:41 PM by Yoly Griffith      No Known Drug Allergies.         Current Medications:     Last Reviewed on 3/26/2018 03:25 PM by Tracey Dalton    Hydrochlorothiazide (HCTZ) 25mg Tablet one a day     Vitamin D3 5,000IU Capsules 2 capsules QD         OBJECTIVE:        Vitals:         Current: 3/26/2018 2:40:38 PM    Ht:  5 ft, 5.75 in;  Wt: 228 lbs;  BMI: 37.1    T: 97.5 F (oral);  BP: 126/79 mm Hg (left arm, sitting);  P: 77 bpm (left arm (BP Cuff), sitting);  sCr: 1.08 mg/dL;  GFR: 72.73        Exams:     PHYSICAL EXAM:     GENERAL: vital signs recorded - well developed, well nourished;  no apparent distress;     NECK: carotid exam reveals no bruits;     RESPIRATORY: normal respiratory rate and pattern with no distress; normal breath sounds with no rales, rhonchi, wheezes or rubs;     CARDIOVASCULAR: normal rate; rhythm is regular;  no systolic murmur; no edema;     MUSCULOSKELETAL: full ROM of left knee, no pain and no tenderness to palpation     PSYCHIATRIC:  appropriate affect and demeanor; normal speech pattern; grossly normal memory;         ASSESSMENT           401.1   I10  Hypertension              DDx:     719.46   M25.562  Knee pain              DDx:         ORDERS:         Meds Prescribed:       Refill of: Hydrochlorothiazide (HCTZ) 25mg Tablet one a day  #90 (Ninety)  tablet(s) Refills: 1         Radiology/Test Orders:       [id number]  Left  radiologic examination, knee; three views  (Send-Out)           Lab Orders:       FUTURE  Future order to be done at patients convenience  (Send-Out)         07254  [facility] Comp. Metabolic Panel  (Send-Out)         97267  [facility] Lipid Panel  (Send-Out)                   PLAN:          Hypertension reviewed last labs,  and 6-2017         FOLLOW-UP TESTING #1: FOLLOW-UP LABORATORY:  Labs to be scheduled in the future include CMP and lipid panel.      RECOMMENDATIONS given include: perform routine monitoring of blood pressure with home blood pressure cuff, exercise, and reduction of dietary salt intake.      FOLLOW-UP: Schedule a follow-up visit in 6 months. return fasting for labs, order given           Prescriptions:       Refill of: Hydrochlorothiazide (HCTZ) 25mg Tablet one a day  #90 (Ninety) tablet(s) Refills: 1           Orders:       FUTURE  Future order to be done at patients convenience  (Send-Out)         70287  [facility] Comp. Metabolic Panel  (Send-Out)         67012  [facility] Lipid Panel  (Send-Out)             Patient Education Handouts:       DASH Diet           Knee pain Continue taking NSAID for pain relief, prn, gave her an order for knee x-ray, will complete later this week         RADIOLOGY:  I have ordered a left knee x-ray to be done today.      FOLLOW-UP: Advised to call if there is no improvement in 2 week(s).            Orders:       [id number]  Left  radiologic examination, knee; three views  (Send-Out)               Patient Recommendations:        For  Hypertension:             The following laboratory testing has been ordered: metabolic panel, comprehensive lipid panel Begin monitoring your blood pressure by brief nurse visits at our office, a home blood pressure monitor, or by checking on the machines in pharmacies or stores.  Keep a log of the readings. Maintain a regular exercise program. Reduce the  amount of salt in your diet.  Schedule a follow-up visit in 6 months.              CHARGE CAPTURE           **Please note: ICD descriptions below are intended for billing purposes only and may not represent clinical diagnoses**        Primary Diagnosis:         401.1 Hypertension            I10    Essential (primary) hypertension              Orders:          [id number]   Office/outpatient visit; established patient, level 4  (In-House)           719.46 Knee pain            M25.562    Pain in left knee

## 2021-05-18 NOTE — PROGRESS NOTES
Iman Wiseman  1965     Office/Outpatient Visit    Visit Date: Fri, Apr 3, 2020 12:56 pm    Provider: Sandra Velasquez MD (Assistant: Nurys Hector, RN)    Location: [facility]        Electronically signed by Sandra Velasquez MD on  2020 01:14:46 PM                             Subjective:        CC: Esther is a 55 year old Black or  female.  sore throat;         HPI: Esther's telehealth visit today is for evaluation of sore throat.  She has had a nagging sore throat for the past week or so.  It got worse yesterday, more severe like Strep throat.  She has no fever or chills.  No cough.  She is having some mild pleuritic pain in certain positions.  No fatigue or malaise.  +Congestion.  No rhinorrhea.    ROS:     CONSTITUTIONAL:  Negative for chills, fatigue, fever and malaise.      EYES:  Negative for blurred vision.      E/N/T:  Positive for nasal congestion and sore throat.   Negative for diminished hearing, frequent rhinorrhea or sinus pressure.      CARDIOVASCULAR:  Negative for chest pain and palpitations.      RESPIRATORY:  Positive for pleuritic chest pain.   Negative for recent cough or dyspnea.      GASTROINTESTINAL:  Negative for abdominal pain, constipation, diarrhea, nausea and vomiting.      MUSCULOSKELETAL:  Negative for arthralgias and myalgias.          Past Medical History / Family History / Social History:         Last Reviewed on 2020 01:00 PM by Sandra Velasquez    Past Medical History:             PAST MEDICAL HISTORY             GYNECOLOGICAL HISTORY:        Problems with pregnancy have included gestational diabetes.          PREVENTIVE HEALTH MAINTENANCE             COLORECTAL CANCER SCREENING: Up to date (colonoscopy q10y; sigmoidoscopy q5y; Cologuard q3y) was last done 19, Results are in chart; colonoscopy with the following abnormalities noted-- Polyp(s); The next colonoscopy is due       MAMMOGRAM: Done within last 2 years and  results in are chart was last done 2020 with normal results         Surgical History:         Cholecystectomy: 2014;     : X 1;     Hysterectomy: Right oopherectomy;     Procedures:    Colonoscopy (  )    EGD ( 19 )    Laparoscopy (  exp lap ) 2019 abdominal abscess drainage/ post op wound infection with wound vac         Family History:     Father:  at age 77;  Cancer (unspecified type)     Mother: Hypertension;  Ovarian Cancer;  Type 2 Diabetes     Brother(s): 4 brother(s) total;  Hypertension     Sister(s): 3 sister(s) total;  Hypertension         Social History:     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  Non-drinker         Substance Abuse History:     Last Reviewed on 2020 01:00 PM by Sandra Velasquez        Mental Health History:     Last Reviewed on 2020 01:00 PM by Sandra Velasquez        Communicable Diseases (eg STDs):     Last Reviewed on 2020 01:00 PM by Sandra Velasquez        Current Problems:     Last Reviewed on 2019 12:35 PM by Opal Carbajal    Irritable bowel syndrome with diarrhea    Other alopecia areata    Hypocalcemia    Family history of diabetes mellitus    Essential (primary) hypertension    Vitamin D deficiency, unspecified    Other fatigue    Overweight    Iron deficiency anemia, unspecified    Mild intermittent asthma, uncomplicated    Other ovarian cyst, left side    Acute kidney failure, unspecified    Cutaneous abscess of abdominal wall    Elevated white blood cell count, unspecified    Infection following a procedure, unspecified, subsequent encounter    Other iron deficiency anemias    Dyspnea, unspecified    Solitary pulmonary nodule    Anxiety disorder, unspecified    Other long term (current) drug therapy    Encounter for  other screening for malignant neoplasm of breast    Encounter for screening for depression        Immunizations:     Hep B (adult dose) 4/25/2016    Hep A, adult dose 4/25/2016    Adacel (Tdap) 1/30/2012        Allergies:     Last Reviewed on 4/03/2020 12:56 PM by Nurys Hector    No Known Allergies.        Current Medications:     Last Reviewed on 4/03/2020 12:56 PM by Nurys Hector    multivitamin with b complex     Probiotic     hydroCHLOROthiazide 25 mg oral tablet [one a day]    Vitamin B12  [monthly injections, starting March 2020]    clonazePAM 0.5 mg oral tablet [take 1 tablet (0.5 mg) by oral route 2 times per day prn anxiety]    cholecalciferol (vitamin D3) 125 mcg (5,000 unit) oral tablet [2 pills once a week]        Assessment:         J02.9   Acute pharyngitis, unspecified           ORDERS:         Meds Prescribed:       [New Rx] amoxicillin 875 mg oral tablet [take 1 tablet (875 mg) by oral route every 12 hours for 7 days], #14 (fourteen) tablets, Refills: 0 (zero)                 Plan:         Acute pharyngitis, unspecifiedSore throat for a week or better.  Will do a trial of amoxicillin as below.  Symptomatic care recommended with OTC analgesics for pain/fever, OTC decongestants and cough suppressants prn.  Stay well hydrated.  Humidifier in the bedroom at night.  Hot tea with lemon and honey.  RTC prn worsening or failure to improve of sx.    Telehealth: Verbal consent obtained for visit to occur via phone call; Staff, other than provider, present during telephone visit include Nurys Hector RN; Total time spent was 6 minutes; 06261--Rdbkhjzho E/M 5-10 minutes           Prescriptions:       [New Rx] amoxicillin 875 mg oral tablet [take 1 tablet (875 mg) by oral route every 12 hours for 7 days], #14 (fourteen) tablets, Refills: 0 (zero)             Charge Capture:         Primary Diagnosis:     J02.9  Acute pharyngitis, unspecified           Orders:      [id number]  Phys/QHP telephone evaluation 5-10  min  (In-House)

## 2021-05-18 NOTE — PROGRESS NOTES
Iman Wiseman  1965     Office/Outpatient Visit    Visit Date: Tue, Feb 11, 2020 02:40 pm    Provider: Tiffani Arango MD (Assistant: Brittanie Rhodes MA)    Location: [facility]        Electronically signed by Tiffani Arango MD on  02/12/2020 11:50:39 AM                             Subjective:        CC: Esther is a 55 year old Black or  female.  This is a follow-up visit.          HPI:           PHQ-9 Depression Screening: Completed form scanned and in chart; Total Score 0       s/p 9 cm cyst removal with her original incision developing an abscess, she was on IV  Vancomycin and zosyn and sent home on oral doxycycline with a  Wound vac placed  and she saw wound care at [facility] and had the wound vac for about 5 weeks, her incision is completely healed.  Her renal failure resolved, GFR on most recent labs was 65.  She saw pulm and had a PET scan that was WNL.  She feels good today and is doing well, needs her BP meds refilled.  She is s/p iron infusion for her iron def anemia and since the infusion her fatigue is gone          Additionally, she presents with history of essential (primary) hypertension.  current nonpharmacologic treatment includes low sodium diet and exercise.  Her current cardiac medication regimen includes a diuretic ( HCTZ ).  Compliance with treatment has been good; she takes her medication as directed, maintains her diet and exercise regimen, and follows up as directed.  Esther does not check her blood pressure other than at her clinic appointments.      ROS:     CONSTITUTIONAL:  Negative for chills, fatigue and fever.      CARDIOVASCULAR:  Negative for chest pain and dizziness.      RESPIRATORY:  Negative for recent cough, dyspnea and pleuritic chest pain.      GASTROINTESTINAL:  Negative for abdominal pain, constipation, diarrhea, nausea and vomiting.      GENITOURINARY:  Negative for dysuria, nocturia, polyuria, urinary incontinence and vaginal discharge.       INTEGUMENTARY/BREAST:  Negative for rash.      NEUROLOGICAL:  Positive for weakness ( generalized ).      PSYCHIATRIC:  Negative for anxiety, depression, sadness, sleep disturbance and suicidal thoughts.          Past Medical History / Family History / Social History:         Last Reviewed on 2020 03:06 PM by Tiffani Arango    Past Medical History:             PAST MEDICAL HISTORY             GYNECOLOGICAL HISTORY:        Problems with pregnancy have included gestational diabetes.          PREVENTIVE HEALTH MAINTENANCE             COLORECTAL CANCER SCREENING: Up to date (colonoscopy q10y; sigmoidoscopy q5y; Cologuard q3y) was last done 19, Results are in chart; colonoscopy with the following abnormalities noted-- Polyp(s); The next colonoscopy is due       MAMMOGRAM: Done within last 2 years and results in are chart was last done 2020 with normal results         Surgical History:         Cholecystectomy: 2014;     : X 1;     Hysterectomy: Right oopherectomy;     Procedures:    Colonoscopy (  )    EGD ( 19 )    Laparoscopy (  exp lap ) 2019 abdominal abscess drainage/ post op wound infection with wound vac         Family History:     Father:  at age 77;  Cancer (unspecified type)     Mother: Hypertension;  Ovarian Cancer;  Type 2 Diabetes     Brother(s): 4 brother(s) total;  Hypertension     Sister(s): 3 sister(s) total;  Hypertension         Social History:     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  Non-drinker         Substance Abuse History:     Last Reviewed on 2016 12:41 PM by Abbey Dior        Mental Health History:     Last Reviewed on 2016 12:41 PM by Abbey Dior        Communicable Diseases (eg STDs):     Last Reviewed  on 5/14/2016 12:41 PM by Abbey Dior        Allergies:     Last Reviewed on 12/19/2019 03:08 PM by Brittanie Rhodes    No Known Allergies.        Current Medications:     Last Reviewed on 2/11/2020 02:44 PM by Brittanie Rhodes    multivitamin with b complex     Hydrochlorothiazide (HCTZ) 25mg Tablet [one a day]    Vitamin B12  [monthly injections, starting March 2020]    clonazePAM 0.5 mg oral tablet [take 1 tablet (0.5 mg) by oral route 2 times per day prn anxiety]    Probiotic         Objective:        Vitals:         Current: 2/11/2020 2:46:01 PM    Ht:  5 ft, 5.75 in;  Wt: 226 lbs;  BMI: 36.8T: 98.1 F (oral);  BP: 114/72 mm Hg (left arm, sitting);  P: 88 bpm (left arm (BP Cuff), sitting);  sCr: 1.85 mg/dL;  GFR: 41.36        Exams:     PHYSICAL EXAM:     GENERAL: vital signs recorded - well developed, well nourished;  no apparent distress;     EYES: PERRL, EOMI     E/N/T: OROPHARYNX:     NECK: range of motion is normal;     RESPIRATORY: normal appearance and symmetric expansion of chest wall; normal respiratory rate and pattern with no distress; normal breath sounds with no rales, rhonchi, wheezes or rubs;     CARDIOVASCULAR: normal rate; rhythm is regular;     GASTROINTESTINAL: nontender, nondistended; no hepatosplenomegaly or masses; no bruits;     MUSCULOSKELETAL: normal gait;     NEUROLOGIC: mental status: alert and oriented x 3;     PSYCHIATRIC: appropriate affect and demeanor; normal psychomotor function; normal speech pattern; normal thought and perception;         Assessment:         Z13.31   Encounter for screening for depression       N17.9   Acute kidney failure, unspecified       L02.211   Cutaneous abscess of abdominal wall       I10   Essential (primary) hypertension       D50.9   Iron deficiency anemia, unspecified       E55.9   Vitamin D deficiency, unspecified           ORDERS:         Meds Prescribed:       [Refilled] hydroCHLOROthiazide 25 mg oral tablet [one a day], #90 (ninety)  tablets, Refills: 1 (one)       [Recorded] cholecalciferol (vitamin D3) 125 mcg (5,000 unit) oral tablet [2 pills once a week]       [Refilled] cholecalciferol (vitamin D3) 125 mcg (5,000 unit) oral tablet [2 pills once a week], #100 (one hundred) tablets, Refills: 0 (zero)         Other Orders:         Depression screen negative  (In-House)            1101F  Pt screen for fall risk; document no falls in past year or only 1 fall w/o injury in past year (JOLANTA)  (In-House)                      Plan:         Encounter for screening for depression    MIPS PHQ-9 Depression Screening: Completed form scanned and in chart; Total Score 0; Negative Depression Screen           Orders:         Depression screen negative  (In-House)            1101F  Pt screen for fall risk; document no falls in past year or only 1 fall w/o injury in past year (JOLANTA)  (In-House)              Acute kidney failure, unspecifiedback to normal        Cutaneous abscess of abdominal wallresolved and has healed completely        Essential (primary) hypertensionwell controlled, labs are UTD          Prescriptions:       [Refilled] hydroCHLOROthiazide 25 mg oral tablet [one a day], #90 (ninety) tablets, Refills: 1 (one)         Iron deficiency anemia, unspecifiedstable post iron transfusion        Vitamin D deficiency, unspecifiedchange to 5000 units 2 pills once a week          Prescriptions:       [Recorded] cholecalciferol (vitamin D3) 125 mcg (5,000 unit) oral tablet [2 pills once a week]       [Refilled] cholecalciferol (vitamin D3) 125 mcg (5,000 unit) oral tablet [2 pills once a week], #100 (one hundred) tablets, Refills: 0 (zero)             Charge Capture:         Primary Diagnosis:     Z13.31  Encounter for screening for depression           Orders:      [id number]  Office/outpatient visit; established patient, level 4  (In-House)              Depression screen negative  (In-House)            1101F  Pt screen for fall risk; document no  falls in past year or only 1 fall w/o injury in past year (JOLANTA)  (In-House)              N17.9  Acute kidney failure, unspecified     L02.211  Cutaneous abscess of abdominal wall     I10  Essential (primary) hypertension     D50.9  Iron deficiency anemia, unspecified     E55.9  Vitamin D deficiency, unspecified

## 2021-05-18 NOTE — PROGRESS NOTES
Bowen Iman A. 1965     Office/Outpatient Visit    Visit Date: Thu, May 3, 2018 03:02 pm    Provider: Tiffani Arango MD (Assistant: Cleo Pederson)    Location: [facility]        Electronically signed by Tiffani Arango MD on  2018 03:22:57 PM                             SUBJECTIVE:        CC: knee pain            HPI:     acute worsening of her L knee pain.  Knee pain started 2-3 months ago, pain is primarily stiffness and pain with twisting of the knee, she has been taking a lot more aleve, saw Tracey and  had an xray knee and it showed  mild degenerative change. No acute osseous abnormalities are identified.  Last night she had sudden onset of severe pain.  No known trauma or injury.  She had walked a lot at work that day.  Her pain was so severe she went to the ER.  They gave her a shot of toradol and a knee brace.  She is here today for further work up. Pain today is 7/10 severity     ROS:     CONSTITUTIONAL:  Negative for chills, fatigue, fever, and weight change.      CARDIOVASCULAR:  Negative for chest pain, palpitations, tachycardia, orthopnea, and edema.      RESPIRATORY:  Negative for cough, dyspnea, and hemoptysis.      MUSCULOSKELETAL:  Positive for arthralgias (left knee pain with flexion. No injury.).      NEUROLOGICAL:  Negative for dizziness, headaches, paresthesias, and weakness.          PMH/FMH/SH:     Last Reviewed on 2018 03:27 PM by Tiffani Arango    Past Medical History:             PAST MEDICAL HISTORY             GYNECOLOGICAL HISTORY:        Problems with pregnancy have included gestational diabetes.          PREVENTIVE HEALTH MAINTENANCE             COLORECTAL CANCER SCREENING: Up to date (colonoscopy q10y; sigmoidoscopy q5y; Cologuard q3y) was last done , Results are in chart; The next colonoscopy is due  2020         Surgical History:         Cholecystectomy: 2014;     : X 1;     Hysterectomy: Right oopherectomy;     Procedures:     Colonoscopy (  dr escobedo/polyp/ follow up due  )         Family History:     Father:  at age 77     Mother: Hypertension;  Ovarian Cancer;  Type 2 Diabetes     Brother(s): 4 brother(s) total;  Hypertension     Sister(s): 3 sister(s) total;  Hypertension         Social History:     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  Non-drinker         Substance Abuse History:     Last Reviewed on 2016 12:41 PM by Abbey Dior        Mental Health History:     Last Reviewed on 2016 12:41 PM by Abbey Dior        Communicable Diseases (eg STDs):     Last Reviewed on 2016 12:41 PM by Abbey Dior            Allergies:     Last Reviewed on 3/26/2018 02:41 PM by Yoly Griffith      No Known Drug Allergies.         Current Medications:     Last Reviewed on 3/26/2018 03:25 PM by Tracey Dalton    Vitamin D3 5,000IU Capsules 2 capsules QD     Hydrochlorothiazide (HCTZ) 25mg Tablet one a day         OBJECTIVE:        Vitals:         Current: 5/3/2018 3:07:20 PM    Ht:  5 ft, 5.75 in;  Wt: 230.9 lbs;  BMI: 37.6    T: 97.3 F (oral);  BP: 131/80 mm Hg (left arm, sitting);  P: 78 bpm (left arm (BP Cuff), sitting);  sCr: 0.83 mg/dL;  GFR: 95.15        Exams:     PHYSICAL EXAM:     GENERAL: vital signs recorded - well developed, well nourished;  no apparent distress;     NECK: carotid exam reveals no bruits;     RESPIRATORY: normal respiratory rate and pattern with no distress; normal breath sounds with no rales, rhonchi, wheezes or rubs;     CARDIOVASCULAR: normal rate; rhythm is regular;  no systolic murmur; no edema;     MUSCULOSKELETAL: full ROM of left knee, pain with medial joint line pressure, pain over bakers cyst and medial collaterol ligament to palpation, positive pain with varus stress.  Neg anterior drawer sign, patella mobile and NT to palpation     PSYCHIATRIC:  appropriate affect and demeanor; normal speech pattern; grossly normal memory;         ASSESSMENT           719.46   M25.562  Knee pain              DDx:         ORDERS:         Meds Prescribed:       Diclofenac Sodium 75mg Tablets, Enteric Coated 1 tab bid with food  #60 (Sixty) tablet(s) Refills: 0         Radiology/Test Orders:       [id number]  Left MRI, any joint of lower extremity w/o contrast  (Send-Out)                   PLAN:          Knee pain         RADIOLOGY:  I have ordered a left w/o contrast knee MRI to be done today.            Prescriptions:       Diclofenac Sodium 75mg Tablets, Enteric Coated 1 tab bid with food  #60 (Sixty) tablet(s) Refills: 0           Orders:       [id number]  Left MRI, any joint of lower extremity w/o contrast  (Send-Out)               CHARGE CAPTURE           **Please note: ICD descriptions below are intended for billing purposes only and may not represent clinical diagnoses**        Primary Diagnosis:         719.46 Knee pain            M25.562    Pain in left knee              Orders:          [id number]   Office/outpatient visit; established patient, level 3  (In-House)               ADDENDUMS:      ____________________________________    Date: 05/21/2018 11:12 AM    Author: Marilyn Roberts         Visit Note Faxed to:        Fernando Castañeda  (Surgery, Orthopedic); Number [phone]

## 2021-05-18 NOTE — PROGRESS NOTES
Iman Wiseman BASIL 1965     Office/Outpatient Visit    Visit Date: Mon, Oct 21, 2019 02:21 pm    Provider: Tracey Dalton N.P. (Assistant: Shelley Hale MA)    Location: [facility]        Electronically signed by Tracey Dalton N.P. on  10/21/2019 04:03:22 PM                             SUBJECTIVE:        CC: (NOT TAKING IRON)     Esther is a 54 year old Black or  female.  She is here today following a transition of care from the emergency department ( 10/12/19 [place] for UTI, also states she has a cyst on L ovary ).          HPI:         Esther presents in follow up from ER. She was seen in the ER on 10-12-19.  She was diagnosed with UTI, ovarain cyst.  The following lab tests were done: urinalysis ( Nitrite +, 4 + bacteria ).  The following radiology tests were done: abdominal CT ( large multiseptated left adnexal cystic mass ).  The patient received the following prescriptions: cipro / naproxen.  The patient's course has improved.      ROS:     CONSTITUTIONAL:  Negative for fever.      GASTROINTESTINAL:  Negative for abdominal pain.      GENITOURINARY:  Negative for hematuria.      MUSCULOSKELETAL:  Negative for back pain.          [facility]/:     Last Reviewed on 10/21/2019 03:59 PM by Tracey Dalton    Past Medical History:             PAST MEDICAL HISTORY             GYNECOLOGICAL HISTORY:        Problems with pregnancy have included gestational diabetes.          PREVENTIVE HEALTH MAINTENANCE             COLORECTAL CANCER SCREENING: Up to date (colonoscopy q10y; sigmoidoscopy q5y; Cologuard q3y) was last done 19, Results are in chart; colonoscopy with the following abnormalities noted-- Polyp(s); The next colonoscopy is due           Surgical History:         Cholecystectomy: 2014;     : X 1;     Hysterectomy: Right oopherectomy;     Procedures:    Colonoscopy (  )    EGD ( 19 )         Family History:     Father:  at age 77;   Cancer (unspecified type)     Mother: Hypertension;  Ovarian Cancer;  Type 2 Diabetes     Brother(s): 4 brother(s) total;  Hypertension     Sister(s): 3 sister(s) total;  Hypertension         Social History:     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  Non-drinker             Immunizations:     Hep B (adult dose) 4/25/2016     Hep A, adult dose 4/25/2016     Adacel (Tdap) 1/30/2012         Allergies:     Last Reviewed on 10/21/2019 02:27 PM by Shelley Hale      No Known Drug Allergies.         Current Medications:     Last Reviewed on 10/21/2019 02:27 PM by Shelley Hale    Vitamin D3 5,000IU Capsules 1 capsule every day     Hydrochlorothiazide (HCTZ) 25mg Tablet one a day     Iron 45 mg one tab daily     Vitamin B12         OBJECTIVE:        Vitals:         Current: 10/21/2019 2:28:35 PM    Ht:  5 ft, 5.75 in;  Wt: 234.4 lbs;  BMI: 38.1    T: 98.5 F (oral);  BP: 140/80 mm Hg (left arm, sitting);  P: 80 bpm (left arm (BP Cuff), sitting);  sCr: 0.91 mg/dL;  GFR: 86.37        Exams:     PHYSICAL EXAM:     GENERAL: vital signs recorded - well developed, well nourished;  no apparent distress;     RESPIRATORY: normal respiratory rate and pattern with no distress; normal breath sounds with no rales, rhonchi, wheezes or rubs;     CARDIOVASCULAR: normal rate; rhythm is regular;  no systolic murmur;     PSYCHIATRIC:  appropriate affect and demeanor; normal speech pattern; grossly normal memory;         ASSESSMENT           620.0   N83.292  Ovarian cyst              DDx:         ORDERS:         Procedures Ordered:       REFER  Referral to Specialist or Other Facility  (Send-Out)                   PLAN:          Ovarian cyst declines flu vaccine /reviewed ER visit note and CT of abd and pelvis, will check with Dr Nuno  Jorge regarding a visit for ovarian cyst evaluation in 2014, reviewed pelvic ultrasound 3-2014         REFERRALS:  Referral initiated to a Gynecologist ( Dr Shanta Arteaga; for evaluation of left ovary ).            Orders:       REFER  Referral to Specialist or Other Facility  (Send-Out)               CHARGE CAPTURE           **Please note: ICD descriptions below are intended for billing purposes only and may not represent clinical diagnoses**        Primary Diagnosis:         620.0 Ovarian cyst            N83.292    Other ovarian cyst, left side              Orders:          [id number]   Office/outpatient visit; established patient, level 3  (In-House)

## 2021-05-18 NOTE — PROGRESS NOTES
Iman Wiseman  1965     Office/Outpatient Visit    Visit Date: Wed, Dec 11, 2019 03:22 pm    Provider: Opal Carbajal N.P. (Assistant: Nurys Hector RN)    Location: [facility]        Electronically signed by Opal Carbajal N.P. on  12/14/2019 12:40:35 PM                             Subjective:        CC: Esther is a 54 year old Black or  female.  She is here today following a transition of care from an inpatient hospital: [facility] for abdominal abscess. The patient was admitted on 11/6/19 and discharged on 12/10/19. During the patient's hospital stay the patient was treated by .          HPI:           Esther presents in follow up from hospital admission. She was admitted to the hospital on 12/6/19 - [facility] and discharged on 12/10/2019 - Dr. Douglas Griffith admitting.  She was diagnosed with Post Operative infection/ abdominal abscess.  The following lab tests were done: CBC ( WBC 21.88 (12/6/19)  20.11 (12/8/19)  17.23 (12/9/19)  12.06 (12/10/19)   ; HGB/HCT - 9.7/29.7 (12/6/19), 8.7/27.3 (12/8/19),  8.2/26 (12/9/19), 7.9/25.7 (12/10/19); ), creatinine ( 1.12 (12/6/19), 2.13(12/9/19), 2.09 (12/10/19) ).  The following radiology tests were done: abdominal CT ( CT noted possible pelvic abscess ).  The following procedures were done: Pelvic fluid drained and cultured (negative), post op wound re-opened, drained and cultured (negative) The patient received IV medications, which included Vancomycin and zosyn x 3 days (stopped on day 3 due to acute kidney injury) and started oral doxycycline and Wound vac placed on day 3.  The patient's course has wound symptoms have improved, but today complains of extreme fatigue and shortness of breath.  It is of severe intensity.  Aggravating factors include exertion and movement in general.  Symptoms are relieved with rest - but still present.  Associated symptoms include extreme fatigue and  dyspnea.  She denies fever.  reports prior history of anemia       Ms. Wiseman was discharged from [facility] after a post op infection.  Her original surgery was an exploratory lap from Dr. Douglas Griffith that was to explore and perform lysis of adhesions in the abdominal cavity secondary to abdominal pain.  Ms. Wiseman reports (no report from original surgery available at the time of the office visit) that the surgery was not able to be completed due to excessive amount of abdominal / pelvic fluid.      ROS:     CONSTITUTIONAL:  Positive for fatigue ( severe ).   Negative for chills or fever.      CARDIOVASCULAR:  Negative for chest pain and dizziness.      RESPIRATORY:  Positive for dyspnea.   Negative for recent cough or pleuritic chest pain.      GASTROINTESTINAL:  Positive for abdominal pain.   Negative for constipation, diarrhea, nausea or vomiting.      INTEGUMENTARY/BREAST:  Positive for wound vac in place to abdominal wall.   Negative for pruritis or rash.      NEUROLOGICAL:  Positive for weakness ( generalized ).          Past Medical History / Family History / Social History:         Last Reviewed on 2019 12:36 PM by Opal Carbajal    Past Medical History:             PAST MEDICAL HISTORY             GYNECOLOGICAL HISTORY:        Problems with pregnancy have included gestational diabetes.          PREVENTIVE HEALTH MAINTENANCE             COLORECTAL CANCER SCREENING: Up to date (colonoscopy q10y; sigmoidoscopy q5y; Cologuard q3y) was last done 19, Results are in chart; colonoscopy with the following abnormalities noted-- Polyp(s); The next colonoscopy is due           Surgical History:         Cholecystectomy: 2014;     : X 1;     Hysterectomy: Right oopherectomy;     Procedures:    Colonoscopy (  )    EGD ( 19 )    Laparoscopy (  exp lap ) 2019 abdominal abscess drainage/ post op wound infection with wound vac         Family History:     Father:   at age 77;  Cancer (unspecified type)     Mother: Hypertension;  Ovarian Cancer;  Type 2 Diabetes     Brother(s): 4 brother(s) total;  Hypertension     Sister(s): 3 sister(s) total;  Hypertension         Social History:     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  Non-drinker         Substance Abuse History:     Last Reviewed on 2016 12:41 PM by Abbey Dior        Mental Health History:     Last Reviewed on 2016 12:41 PM by Abbey Dior        Communicable Diseases (eg STDs):     Last Reviewed on 2016 12:41 PM by Abbey Dior        Current Problems:     Last Reviewed on 2019 12:35 PM by Opal Carbajal    Irritable bowel syndrome with diarrhea    Other alopecia areata    Hypocalcemia    Family history of diabetes mellitus    Essential (primary) hypertension    Other fatigue    Vitamin D deficiency, unspecified    Overweight    Iron deficiency anemia, unspecified    Mild intermittent asthma, uncomplicated    Other ovarian cyst, left side    Acute kidney failure, unspecified    Infection following a procedure, unspecified, subsequent encounter    Other iron deficiency anemias    Cutaneous abscess of abdominal wall    Elevated white blood cell count, unspecified    Dyspnea, unspecified        Immunizations:     Hep B (adult dose) 2016    Hep A, adult dose 2016    Adacel (Tdap) 2012        Allergies:     Last Reviewed on 2019 03:23 PM by Nurys Hector    No Known Allergies.        Current Medications:     Last Reviewed on 10/21/2019 02:27 PM by Shelley Hale    cholecalciferol (vitamin D3) 5,000 unit oral capsule [1 capsule every day]    Iron 45 mg one tab daily     Hydrochlorothiazide (HCTZ) 25mg Tablet [one a day]    Vitamin B12     IBUPROFEN    TAB 600MG   [one tab Q6H prn]    DOXYCYCL HYC CAP 100MG  [BID]        Objective:        Vitals:         Current: 12/11/2019 3:36:01 PM    Ht:  5 ft, 5.75 in;  Wt: 227 lbs;  BMI: 36.9T: 98 F (oral);  BP: 114/60 mm Hg (right arm, sitting);  P: 95 bpm (right arm (BP Cuff), sitting);  sCr: 0.91 mg/dL;  GFR: 85.20O2 Sat: 100 % (room air)        Exams:     PHYSICAL EXAM:     GENERAL: vital signs recorded - well developed, well nourished;  no apparent distress;     NECK: range of motion is normal;     RESPIRATORY: normal appearance and symmetric expansion of chest wall; normal respiratory rate and pattern with no distress; normal breath sounds with no rales, rhonchi, wheezes or rubs;     CARDIOVASCULAR: normal rate; rhythm is regular;  conjuntival pallor;     GASTROINTESTINAL: mild lower abdominal wall secondary to incision / wound tenderness;  normal bowel sounds;     BREAST/INTEGUMENT: wound vac in place to midline abdomen - drainage is sanguinous - small amount;  dressing intact without leaks;  visible area around wound without erythema, tenderness or induration;     MUSCULOSKELETAL: normal gait; normal range of motion of all major muscle groups; no limb or joint pain with range of motion;     NEUROLOGIC: mental status: alert and oriented x 3;     PSYCHIATRIC: appropriate affect and demeanor; normal speech pattern; normal thought and perception;         Assessment:         L02.211   Cutaneous abscess of abdominal wall       T81.40XD   Infection following a procedure, unspecified, subsequent encounter       D72.829   Elevated white blood cell count, unspecified       N17.9   Acute kidney failure, unspecified       D50.8   Other iron deficiency anemias           ORDERS:         Lab Orders:       47291  BDCBC - [facility] CBC with 3 part diff  (Send-Out; Stat)            94325  BMP - [facility] Basic Metabolic Panel  (Send-Out)            15038  IRONP - [facility] Iron and TIBC  (Send-Out)                      Plan:         Cutaneous abscess of abdominal  wall        RECOMMENDATIONS given include: follow up with Dr. Griffith as scheduled.  Surgical site looks good today - drainage with no active signs of infection.  Wound vac dressing changes scheduled for Monday/ Wednesday/ Fridays at [facility] wound care center.          Infection following a procedure, unspecified, subsequent encounterchecking labs - follow up with Dr. Griffith as recommended and an appt with Dr. Arango is scheduled for next week        Elevated white blood cell count, unspecifiedWe will check her WBC today, have her continue with the doxycycline     LABORATORY:  Labs ordered to be performed today include CBC.            [facility]:       [id number]  ([facility]; Stat)              Acute kidney failure, unspecifiedfollow up on labs to ensure stable / improving    LABORATORY:  Labs ordered to be performed today include basic metabolic panel.            Orders:       [id number]  [facility] - [facility] Basic Metabolic Panel  (Send-Out)              Other iron deficiency anemiascurrently taking iron one time daily.  His H&H was fairly low on discharge.  Checking CBC as well as iron levels  May need to increase frequency of iron.  She was seen by Dr. Alamo in the past and may need to follow up in office depending on levels    LABORATORY:  Labs ordered to be performed today include Iron, serum and TIBC.            Orders:       [id number]  IRON - [facility] Iron and TIBC  (Send-Out)                  Patient Recommendations:        For  Cutaneous abscess of abdominal wall:    I also recommend follow up with Dr. Griffith as scheduled.  Surgical site looks good today - drainage with no active signs of infection.  Wound vac dressing changes scheduled for Monday/ Wednesday/ Fridays at [facility] wound care center.              Charge Capture:         Primary Diagnosis:     L02.211  Cutaneous abscess of abdominal wall           Orders:      01327  Transitional care manage service 7 day discharge  (In-House)               T81.40XD  Infection following a procedure, unspecified, subsequent encounter     D72.829  Elevated white blood cell count, unspecified     N17.9  Acute kidney failure, unspecified     D50.8  Other iron deficiency anemias         ADDENDUMS:      ____________________________________    Addendum: 12/30/2019 02:48 PM - One, Team         Visit Note Faxed to:        User Entered Recipient; Number [phone]

## 2021-05-18 NOTE — PROGRESS NOTES
Iman Wiseman  1965     Office/Outpatient Visit    Visit Date: Mon, May 11, 2020 12:22 pm    Provider: Tiffani Arango MD (Assistant: Spurling, Sarah C, MA)    Location: [facility]        Electronically signed by Tiffani Arango MD on  2020 04:12:18 PM                             Subjective:        CC: Esther is a 55 year old Black or  female.  facetime [phone];         HPI:           Patient presents with essential (primary) hypertension.  Current nonpharmacologic treatment includes low sodium diet and exercise.  Her current cardiac medication regimen includes a diuretic ( HCTZ ).  Compliance with treatment has been good; she takes her medication as directed, maintains her diet and exercise regimen, and follows up as directed.  Esther does not check her blood pressure other than at her clinic appointments.      ROS:     CONSTITUTIONAL:  Positive for fatigue.   Negative for chills or fever.      CARDIOVASCULAR:  Negative for chest pain and dizziness.      RESPIRATORY:  Negative for recent cough, dyspnea and pleuritic chest pain.      GASTROINTESTINAL:  Negative for abdominal pain, constipation, diarrhea, nausea and vomiting.      GENITOURINARY:  Negative for dysuria, nocturia, polyuria, urinary incontinence and vaginal discharge.      INTEGUMENTARY/BREAST:  Positive for darkening of skin on palms of her hands.   Negative for rash.      NEUROLOGICAL:  Positive for weakness ( generalized ).      PSYCHIATRIC:  Negative for anxiety, depression, sadness, sleep disturbance and suicidal thoughts.          Past Medical History / Family History / Social History:         Last Reviewed on 2020 12:48 PM by Tiffani Arango    Past Medical History:             PAST MEDICAL HISTORY             GYNECOLOGICAL HISTORY:        Problems with pregnancy have included gestational diabetes.          PREVENTIVE HEALTH MAINTENANCE             COLORECTAL CANCER SCREENING: Up to  date (colonoscopy q10y; sigmoidoscopy q5y; Cologuard q3y) was last done 19, Results are in chart; colonoscopy with the following abnormalities noted-- Polyp(s); The next colonoscopy is due       MAMMOGRAM: Done within last 2 years and results in are chart was last done 2020 with normal results         Surgical History:         Cholecystectomy: 2014;     : X 1;     Hysterectomy: Right oopherectomy;     Procedures:    Colonoscopy (  )    EGD ( 19 )    Laparoscopy (  exp lap ) 2019 abdominal abscess drainage/ post op wound infection with wound vac         Family History:     Father:  at age 77;  Cancer (unspecified type)     Mother: Hypertension;  Ovarian Cancer;  Type 2 Diabetes     Brother(s): 4 brother(s) total;  Hypertension     Sister(s): 3 sister(s) total;  Hypertension         Social History:     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  Non-drinker         Substance Abuse History:     Last Reviewed on 2020 01:10 PM by Sandra Velasquez        Mental Health History:     Last Reviewed on 2020 01:10 PM by Sandra Velasquez        Communicable Diseases (eg STDs):     Last Reviewed on 2020 01:10 PM by Sandra Velasquez        Allergies:     Last Reviewed on 2020 12:22 PM by Spurling, Sarah C    No Known Allergies.        Current Medications:     Last Reviewed on 2020 12:23 PM by Spurling, Sarah C    Probiotic     multivitamin with b complex     hydroCHLOROthiazide 25 mg oral tablet [one a day]    clonazePAM 0.5 mg oral tablet [take 1 tablet (0.5 mg) by oral route 2 times per day prn anxiety]    cholecalciferol (vitamin D3) 125 mcg (5,000 unit) oral tablet [2 pills once a week]        Objective:        Vitals:         Current: 2020 12:52:08 PM    Ht:   5 ft, 5.75 in;  Wt: 230 lbs;  BMI: 37.4T: 97.3 F (oral);  BP: 127/83 mm Hg (left arm, sitting);  sCr: 1.85 mg/dL;  GFR: 41.67        Exams:     PHYSICAL EXAM:     GENERAL: vital signs recorded - well developed, well nourished;  well groomed;  no apparent distress;     EYES: PERRL, EOMI     RESPIRATORY: normal respiratory rate and pattern with no distress;     skin-increased darkening of the skin in the folds of the palmar surfaces    NEUROLOGIC: mental status: oriented to person, place, and time;  GROSSLY INTACT     PSYCHIATRIC:  appropriate affect and demeanor; normal speech pattern; grossly normal memory;         Assessment:         I10   Essential (primary) hypertension       E55.9   Vitamin D deficiency, unspecified       R53.83   Other fatigue       E66.3   Overweight       Z13.6   Encounter for screening for cardiovascular disorders           ORDERS:         Lab Orders:       [id number]  VITD - [facility] Vitamin D, 25 Hydroxy  (Send-Out)            43445  THYII - [facility] Thyroid panel with TSH (54475, 88531)  (Send-Out)            37145  A1CEG - [facility] Hemoglobin A1C  (Send-Out)            16581  LPDP - [facility] Lipid Panel  (Send-Out)                      Plan:         Essential (primary) hypertensionwell controlled    Telehealth: Verbal consent obtained for visit to occur via televideo conferencing; Staff, other than provider, present during telephone visit include Sarah Spurling         Vitamin D deficiency, unspecified    LABORATORY:  Labs ordered to be performed today include Vitamin D.            Orders:       [id number]  VITD - [facility] Vitamin D, 25 Hydroxy  (Send-Out)              Other fatigue    LABORATORY:  Labs ordered to be performed today include Thyroid Panel.            Orders:       87656  THYII - [facility] Thyroid panel with TSH (55293, 29802)  (Send-Out)              Overweightcounseled on starting a low carb diet        Encounter for screening for cardiovascular disorders    LABORATORY:  Labs ordered to be performed today  include HgbA1C and lipid panel.            Orders:       68393  A1C - [facility] Hemoglobin A1C  (Send-Out)            63553  LP - [facility] Lipid Panel  (Send-Out)                  Charge Capture:         Primary Diagnosis:     I10  Essential (primary) hypertension           Orders:      55905  Office/outpatient visit; established patient, level 4  (In-House)              E55.9  Vitamin D deficiency, unspecified     R53.83  Other fatigue     E66.3  Overweight     Z13.6  Encounter for screening for cardiovascular disorders

## 2021-05-18 NOTE — PROGRESS NOTES
Iman Wiseman  1965     Office/Outpatient Visit    Visit Date: Tue, Sep 1, 2020 10:53 am    Provider: Tiffani Arango MD (Assistant: Cuauhtemoc Yusuf, )    Location: [facility]        Electronically signed by Tiffani Arango MD on  09/01/2020 03:46:56 PM                             Subjective:        CC: hypertension f/u c/o diarrhea    HPI:       Esther is s/p cyst removal from abd, and s/p cholecystectomy and she is doing well, c/o diarrhea, this tends to happen when she eats and forgets to take her probiotic.           Additionally, she presents with history of essential (primary) hypertension.  current nonpharmacologic treatment includes low sodium diet and exercise.  Her current cardiac medication regimen includes a diuretic ( HCTZ ).  Compliance with treatment has been good; she takes her medication as directed, maintains her diet and exercise regimen, and follows up as directed.  Esther does not check her blood pressure other than at her clinic appointments.        iron def anemia, she is only on vitamin B now, she feels great, no fatigue.  She has vitamin D deficiency and is on vitamin D supplementation    ROS:     CONSTITUTIONAL:  Positive for fatigue.   Negative for chills or fever.      CARDIOVASCULAR:  Negative for chest pain and dizziness.      RESPIRATORY:  Negative for recent cough, dyspnea and pleuritic chest pain.      GASTROINTESTINAL:  Positive for abdominal pain and diarrhea.   Negative for constipation, nausea or vomiting.      GENITOURINARY:  Negative for dysuria, nocturia, polyuria, urinary incontinence and vaginal discharge.      INTEGUMENTARY/BREAST:  Positive for darkening of skin on palms of her hands.   Negative for rash.      NEUROLOGICAL:  Positive for weakness ( generalized ).      PSYCHIATRIC:  Negative for anxiety, depression, sadness, sleep disturbance and suicidal thoughts.          Past Medical History / Family History / Social History:         Last Reviewed  on 2020 11:25 AM by Tiffani Arango    Past Medical History:             PAST MEDICAL HISTORY             GYNECOLOGICAL HISTORY:        Problems with pregnancy have included gestational diabetes.          PREVENTIVE HEALTH MAINTENANCE             COLORECTAL CANCER SCREENING: Up to date (colonoscopy q10y; sigmoidoscopy q5y; Cologuard q3y) was last done 19, Results are in chart; colonoscopy with the following abnormalities noted-- Polyp(s); The next colonoscopy is due       MAMMOGRAM: Done within last 2 years and results in are chart was last done 2020 with normal results         Surgical History:         Cholecystectomy: 2014;     : X 1;     Hysterectomy: Right oopherectomy;     Procedures:    Colonoscopy (  )    EGD ( 19 )    Laparoscopy (  exp lap ) 2019 abdominal abscess drainage/ post op wound infection with wound vac         Family History:     Father:  at age 77;  Cancer (unspecified type)     Mother: Hypertension;  Ovarian Cancer;  Type 2 Diabetes     Brother(s): 4 brother(s) total;  Hypertension     Sister(s): 3 sister(s) total;  Hypertension         Social History:     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  Non-drinker         Substance Abuse History:     Last Reviewed on 2020 01:10 PM by Sandra Velasquez        Mental Health History:     Last Reviewed on 2020 01:10 PM by Sandra Velasquez        Communicable Diseases (eg STDs):     Last Reviewed on 2020 01:10 PM by Sandra Velasquez        Allergies:     Last Reviewed on 2020 08:43 AM by Cuauhtemoc Yusuf    No Known Allergies.        Current Medications:     Last Reviewed on 2020 08:43 AM by Cuauhtemoc Yusuf    Probiotic     multivitamin with b complex     hydroCHLOROthiazide 25 mg  oral tablet [one a day]    clonazePAM 0.5 mg oral tablet [take 1 tablet (0.5 mg) by oral route 2 times per day prn anxiety]    cholecalciferol (vitamin D3) 125 mcg (5,000 unit) oral tablet [2 pills once a week]    triamterene-hydrochlorothiazid 37.5-25 mg oral capsule [take 1 capsule by oral route once daily]    methocarbamoL 750 mg oral tablet [take 1 tablet (750 mg) by oral route 3 times per day prn muscle spasm]        Objective:        Vitals:         Current: 9/1/2020 10:58:44 AM    Ht:  5 ft, 5.75 in;  Wt: 243.2 lbs;  BMI: 39.6T: 96.9 F (temporal);  BP: 116/55 mm Hg (right arm, sitting);  P: 90 bpm (right arm (BP Cuff), sitting);  sCr: 0.96 mg/dL;  GFR: 82.22        Exams:     PHYSICAL EXAM:     GENERAL: vital signs recorded - well developed, well nourished;  no apparent distress;     EYES: PERRL, EOMI     E/N/T: OROPHARYNX:     NECK: range of motion is normal;     RESPIRATORY: normal appearance and symmetric expansion of chest wall; normal respiratory rate and pattern with no distress; normal breath sounds with no rales, rhonchi, wheezes or rubs;     CARDIOVASCULAR: normal rate; rhythm is regular;  conjuntival pallor;     GASTROINTESTINAL: nontender, nondistended; no hepatosplenomegaly or masses; no bruits;     MUSCULOSKELETAL: normal gait; normal range of motion of all major muscle groups; no limb or joint pain with range of motion;     NEUROLOGIC: mental status: alert and oriented x 3;     PSYCHIATRIC: appropriate affect and demeanor; normal psychomotor function; normal speech pattern; normal thought and perception;         Lab/Test Results:         Vitamin D, 25-Hydroxy: 33.3 (ng/mL) (05/23/2020),     Hemoglobin A1c: 5.4 (%) (05/23/2020),     TSH: 2.070 (mIU/L) (05/23/2020),     Creatinine, Serum: 0.96 (mg/dl) (05/23/2020),     Glom Filt Rate, Est: >60 (ml/min/1.73m2) (05/23/2020),     HDL: 46 (mg/dL) (05/23/2020),     Na: 139 (mmol/L) (05/23/2020),     Total Cholesterol: 146 (mg/dL) (05/23/2020),      Triglycerides: 73 (mg/dL) (05/23/2020),             Assessment:         K58.0   Irritable bowel syndrome with diarrhea       I10   Essential (primary) hypertension       E55.9   Vitamin D deficiency, unspecified       D50.9   Iron deficiency anemia, unspecified           ORDERS:         Meds Prescribed:       [Recorded] colestipoL 1 gram oral tablet [take 1 tablet (1 gram) by oral route 2 times per day swallowing wholewith any liquid. Do not crush, chew and/or divide.]       [Refilled] cholecalciferol (vitamin D3) 125 mcg (5,000 unit) oral tablet [2 pills once a week], #100 (one hundred) tablets, Refills: 1 (one)       [Refilled] triamterene-hydrochlorothiazid 37.5-25 mg oral capsule [take 1 capsule by oral route once daily], #90 (ninety) capsules, Refills: 1 (one)       [Refilled] colestipoL 1 gram oral tablet [take 1 tablet (1 gram) by oral route 2 times per day swallowing wholewith any liquid. Do not crush, chew and/or divide.], #100 (one hundred) tablets, Refills: 1 (one)         Lab Orders:       [id number]  VB12 - [facility] Vitamin B12  (Send-Out)            36943  BDCB2 - [facility] CBC w/o diff  (Send-Out)            34624  IRON - [facility] Iron, serum  (Send-Out)            08699  gliadin IgA antibody  (Send-Out)                      Plan:         Irritable bowel syndrome with diarrheawill try her on colestipol and check for gluten allergen          Prescriptions:       [Recorded] colestipoL 1 gram oral tablet [take 1 tablet (1 gram) by oral route 2 times per day swallowing wholewith any liquid. Do not crush, chew and/or divide.]       [Refilled] cholecalciferol (vitamin D3) 125 mcg (5,000 unit) oral tablet [2 pills once a week], #100 (one hundred) tablets, Refills: 1 (one)       [Refilled] triamterene-hydrochlorothiazid 37.5-25 mg oral capsule [take 1 capsule by oral route once daily], #90 (ninety) capsules, Refills: 1 (one)       [Refilled] colestipoL 1 gram oral tablet [take 1 tablet (1 gram) by oral route 2 times per day  swallowing wholewith any liquid. Do not crush, chew and/or divide.], #100 (one hundred) tablets, Refills: 1 (one)           Orders:       [id number]  gliadin IgA antibody  (Send-Out)              Essential (primary) hypertension        RECOMMENDATIONS given include: avoidance of caffeine, avoidance of cigarette smoke, keep blood pressure log as directed, healthy carb, high healthy protein and high fiber diet, avoid salt in diet, f/u every 6 months for BP checks and labs, and exercise 30 min 3-4 days a week.          Vitamin D deficiency, unspecifiedstable on meds        Iron deficiency anemia, unspecified    LABORATORY:  Labs ordered to be performed today include B12, CBC W/O DIFF, and Iron Serum.            Orders:       46227  VB12 - [facility] Vitamin B12  (Send-Out)            36558  BDCB2 - [facility] CBC w/o diff  (Send-Out)            84978  IRON - [facility] Iron, serum  (Send-Out)                  Patient Recommendations:        For  Essential (primary) hypertension:    Try to avoid or reduce the amount of caffeine intake. Avoid cigarette smoke. Keep a daily blood pressure log and report elevated blood pressure to provider as directed. Drink plenty of fluids.  Fever increases the loss of fluids and can lead to dehydration.              Charge Capture:         Primary Diagnosis:     K58.0  Irritable bowel syndrome with diarrhea           Orders:      [id number]  Office/outpatient visit; established patient, level 4  (In-House)              I10  Essential (primary) hypertension     E55.9  Vitamin D deficiency, unspecified     D50.9  Iron deficiency anemia, unspecified

## 2021-05-18 NOTE — PROGRESS NOTES
Iman Wiseman  1965     Office/Outpatient Visit    Visit Date: Thu, Dec 19, 2019 03:01 pm    Provider: Tiffani Arango MD (Assistant: Brittanie Rhodes MA)    Location: [facility]        Electronically signed by Tiffani Arango MD on  12/26/2019 10:13:55 AM                             Subjective:        CC: Esther is a 54 year old Black or  female.  This is a follow-up visit.  f/u wound care        HPI:       Esther presents to re establish care with me, she is s/p hospital admission on 12/6/19 - [facility] and discharged on 12/10/2019 - Dr. Douglas Griffith admitting.  She was diagnosed with Post Operative infection/ abdominal abscess s/p exploratory lab for lysis of adhesions NOV 25th  and pseudocyt removal in the abdominal cavity secondary to abdominal pain.  She was in the hospital 10 days. Post op she developed a fever and went to the ER and  the  abdominal CT ( CT noted possible pelvic abscess ).  The following procedures were done: Pelvic fluid drained and cultured (negative), post op wound re-opened, drained and cultured (negative) The patient received IV medications, which included Vancomycin and zosyn x 3 days (stopped on day 3 due to acute kidney injury) and started oral doxycycline and Wound vac placed on day 3.   She presented for her JAVID with increased shortness of breath,severe intensity and saw M Mouser and was sent for a CT chest to r/o PE and it was negative.  She is following up with me today.  .She still c/o extreme fatigue and dyspnea.  but no fever.      referal to [facility] pulmonologist to eval need for PET, because it looks like a lung nodule that is growing.  Double check her smoking status.    ROS:     CONSTITUTIONAL:  Positive for fatigue ( severe ).   Negative for chills or fever.      EYES:  Negative for blurred vision and eye pain.      E/N/T:  Negative for ear pain, nasal congestion and sore throat.      CARDIOVASCULAR:  Negative for  chest pain and dizziness.      RESPIRATORY:  Positive for dyspnea.   Negative for recent cough or pleuritic chest pain.      GASTROINTESTINAL:  Positive for abdominal pain.   Negative for constipation, diarrhea, nausea or vomiting.      GENITOURINARY:  Negative for dysuria, nocturia and polyuria.      INTEGUMENTARY/BREAST:  Positive for wound vac in place to abdominal wall.   Negative for pruritis or rash.      NEUROLOGICAL:  Positive for weakness ( generalized ).      PSYCHIATRIC:  Negative for anxiety, depression, sadness, sleep disturbance and suicidal thoughts.          Past Medical History / Family History / Social History:         Last Reviewed on 2019 03:52 PM by Tiffani Arango    Past Medical History:             PAST MEDICAL HISTORY             GYNECOLOGICAL HISTORY:        Problems with pregnancy have included gestational diabetes.          PREVENTIVE HEALTH MAINTENANCE             COLORECTAL CANCER SCREENING: Up to date (colonoscopy q10y; sigmoidoscopy q5y; Cologuard q3y) was last done 19, Results are in chart; colonoscopy with the following abnormalities noted-- Polyp(s); The next colonoscopy is due           Surgical History:         Cholecystectomy: 2014;     : X 1;     Hysterectomy: Right oopherectomy;     Procedures:    Colonoscopy (  )    EGD ( 19 )    Laparoscopy (  exp lap ) 2019 abdominal abscess drainage/ post op wound infection with wound vac         Family History:     Father:  at age 77;  Cancer (unspecified type)     Mother: Hypertension;  Ovarian Cancer;  Type 2 Diabetes     Brother(s): 4 brother(s) total;  Hypertension     Sister(s): 3 sister(s) total;  Hypertension         Social History:     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   Non-drinker         Substance Abuse History:     Last Reviewed on 5/14/2016 12:41 PM by Abbey Dior        Mental Health History:     Last Reviewed on 5/14/2016 12:41 PM by Abbey Dior        Communicable Diseases (eg STDs):     Last Reviewed on 5/14/2016 12:41 PM by Abbey Dior        Allergies:     Last Reviewed on 12/11/2019 03:23 PM by Nurys Hector    No Known Allergies.        Current Medications:     Last Reviewed on 12/19/2019 03:10 PM by Brittanie Rhodes    Hydrochlorothiazide (HCTZ) 25mg Tablet [one a day]    Vitamin B12     ferrous sulfate 325 mg (65 mg iron) oral tablet [take 1 tablet (325 mg) by oral route 3 times per day]    multivitamin with b complex         Objective:        Vitals:         Current: 12/19/2019 3:13:17 PM    Ht:  5 ft, 5.75 in;  Wt: 221.2 lbs;  BMI: 36.0T: 97.4 F (oral);  BP: 117/61 mm Hg (right arm, sitting);  P: 102 bpm (right arm (BP Cuff), sitting);  sCr: 1.99 mg/dL;  GFR: 38.53O2 Sat: 100 % (room air)        Exams:     PHYSICAL EXAM:     GENERAL: vital signs recorded - well developed, well nourished;  no apparent distress;     EYES: PERRL, EOMI     E/N/T: OROPHARYNX:     NECK: range of motion is normal;     RESPIRATORY: normal appearance and symmetric expansion of chest wall; normal respiratory rate and pattern with no distress; normal breath sounds with no rales, rhonchi, wheezes or rubs;     CARDIOVASCULAR: normal rate; rhythm is regular;  conjuntival pallor;     GASTROINTESTINAL: mild lower abdominal wall secondary to incision / wound-skin is  clear around the open wound tenderness;  normal bowel sounds;     BREAST/INTEGUMENT: wound vac in place to midline abdomen - drainage is sanguinous - small amount;  dressing intact without leaks;  visible area around wound without erythema, tenderness or induration;     MUSCULOSKELETAL: normal gait; normal range of motion of all major muscle groups; no limb or joint pain with range of motion;     NEUROLOGIC: mental  status: alert and oriented x 3;     PSYCHIATRIC: appropriate affect and demeanor; normal psychomotor function; normal speech pattern; normal thought and perception;         Lab/Test Results:         Urine temperature: confirmed (12/19/2019),     All urine drug screen levels confirmed negative: yes (12/19/2019),     Date and time of last pill: NEW RX   /MNP (12/19/2019),     Performed by: tls (12/19/2019),     Collection Time: 1615 (12/19/2019),             Assessment:         T81.40XD   Infection following a procedure, unspecified, subsequent encounter       R06.00   Dyspnea, unspecified       R91.1   Solitary pulmonary nodule       D50.8   Other iron deficiency anemias       N17.9   Acute kidney failure, unspecified       F41.9   Anxiety disorder, unspecified       Z79.899   Other long term (current) drug therapy           ORDERS:         Meds Prescribed:       [Recorded] clonazePAM 0.5 mg oral tablet [take 1 tablet (0.5 mg) by oral route 2 times per day]       [Refilled] clonazePAM 0.5 mg oral tablet [take 1 tablet (0.5 mg) by oral route 2 times per day prn anxiety], #40 (forty) tablets, Refills: 0 (zero)         Lab Orders:       [id number]  CB - [facility] CBC w/o diff  (Send-Out)            50496  [facility] Basic Metabolic Panel  (Send-Out)            92064  Drug test prsmv read direct optical obs pr date  (In-House)                      Plan:         Infection following a procedure, unspecified, subsequent encounterhealing nicely, cont abx and wound vaccum        Dyspnea, unspecified CT chest was neg.        Solitary pulmonary nodulereferal to [facility] pulmonologist to eval need for PET, because it looks like a lung nodule that is growing. NO tobacco use.        Other iron deficiency anemiasshe is on iron 325 mg tid, HCT last week was 29    LABORATORY:  Labs ordered to be performed today include CBC W/O DIFF.            Orders:       [id number]  BDCB2 - [facility] CBC w/o diff  (Send-Out)              Acute kidney failure,  unspecified    LABORATORY:  Labs ordered to be performed today include basic metabolic panel.            Orders:       32403  [facility] Basic Metabolic Panel  (Send-Out)              Anxiety disorder, unspecifieddue to her present health state, I will start her on klonnipin prn bid          Prescriptions:       [Recorded] clonazePAM 0.5 mg oral tablet [take 1 tablet (0.5 mg) by oral route 2 times per day]       [Refilled] clonazePAM 0.5 mg oral tablet [take 1 tablet (0.5 mg) by oral route 2 times per day prn anxiety], #40 (forty) tablets, Refills: 0 (zero)         Other long term (current) drug therapy        RECOMMENDATIONS given include: shaista reviewed, drug screen performed and appropriate, consent is reviewed and signed and on the chart, she is aware of risk of addiction on this medication and understands that she will need to follow up for a review every 3 months and her medications will be adjusted or decreased as deemed appropriate at each visit.  No personal history of drug or alcohol abuse.  No concerns about diversion or abuse.  She denies side effects related to the medication.  She is  aware that she may be called in for pill counts..            Orders:       [id number]  Drug test prsmv read direct optical obs pr date  (In-House)                  Charge Capture:         Primary Diagnosis:     T81.40XD  Infection following a procedure, unspecified, subsequent encounter           Orders:      85108  Office/outpatient visit; established patient, level 4  (In-House)              R06.00  Dyspnea, unspecified     R91.1  Solitary pulmonary nodule     D50.8  Other iron deficiency anemias     N17.9  Acute kidney failure, unspecified     F41.9  Anxiety disorder, unspecified     Z79.899  Other long term (current) drug therapy           Orders:      49227  Drug test prsmv read direct optical obs pr date  (In-House)                  ADDENDUMS:      ____________________________________    Addendum: 12/30/2019 02:49 PM -  One, Team         Visit Note Faxed to:        User Entered Recipient; Number [phone]

## 2021-05-18 NOTE — PROGRESS NOTES
Bowen Iman A  1965     Office/Outpatient Visit    Visit Date: Wed, May 27, 2020 08:42 am    Provider: Tiffani Arango MD (Assistant: Cuauhtemoc Yusuf, )    Location: [facility]        Electronically signed by Tiffani Arango MD on  2020 04:58:26 PM                             Subjective:        CC: SHANNON [phone]    HPI:       2-3 weeks of upper back pain, onset 2-3 weeks ago, mid back and under shoulder blades,  she takes tylenol prn for pain, no known injury.  She has had this pain in past, Usually goes away on its.     ROS:     CONSTITUTIONAL:  Negative for chills and fever.      CARDIOVASCULAR:  Negative for chest pain and dizziness.      RESPIRATORY:  Negative for recent cough, dyspnea and pleuritic chest pain.      GASTROINTESTINAL:  Negative for abdominal pain, constipation, diarrhea, nausea and vomiting.      NEUROLOGICAL:  Negative for dizziness and headaches.      PSYCHIATRIC:  Negative for anxiety, depression, sadness, sleep disturbance and suicidal thoughts.          Past Medical History / Family History / Social History:         Last Reviewed on 2020 09:35 AM by Tiffani Arango    Past Medical History:             PAST MEDICAL HISTORY             GYNECOLOGICAL HISTORY:        Problems with pregnancy have included gestational diabetes.          PREVENTIVE HEALTH MAINTENANCE             COLORECTAL CANCER SCREENING: Up to date (colonoscopy q10y; sigmoidoscopy q5y; Cologuard q3y) was last done 19, Results are in chart; colonoscopy with the following abnormalities noted-- Polyp(s); The next colonoscopy is due       MAMMOGRAM: Done within last 2 years and results in are chart was last done 2020 with normal results         Surgical History:         Cholecystectomy: 2014;     : X 1;     Hysterectomy: Right oopherectomy;     Procedures:    Colonoscopy (  )    EGD ( 19 )    Laparoscopy (  exp lap ) 2019  abdominal abscess drainage/ post op wound infection with wound vac         Family History:     Father:  at age 77;  Cancer (unspecified type)     Mother: Hypertension;  Ovarian Cancer;  Type 2 Diabetes     Brother(s): 4 brother(s) total;  Hypertension     Sister(s): 3 sister(s) total;  Hypertension         Social History:     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  Non-drinker         Substance Abuse History:     Last Reviewed on 2020 01:10 PM by Sandra Velasquez        Mental Health History:     Last Reviewed on 2020 01:10 PM by Sandra Velasquez        Communicable Diseases (eg STDs):     Last Reviewed on 2020 01:10 PM by Sandra Velasquez        Allergies:     Last Reviewed on 2020 12:22 PM by Spurling, Sarah C    No Known Allergies.        Current Medications:     Last Reviewed on 2020 12:48 PM by Tiffani Arango    multivitamin with b complex     Probiotic     hydroCHLOROthiazide 25 mg oral tablet [one a day]    clonazePAM 0.5 mg oral tablet [take 1 tablet (0.5 mg) by oral route 2 times per day prn anxiety]    cholecalciferol (vitamin D3) 125 mcg (5,000 unit) oral tablet [2 pills once a week]    furosemide 20 mg oral tablet [take 1 tablet (20 mg) by oral route daily for 3 days]        Objective:        Vitals:         Current: 2020 9:10:03 AM    Ht:  5 ft, 5.75 inBP: 122/86 mm Hg (left arm, sitting);  P: 86 bpm (left arm (BP Cuff), sitting);  R: 16 bpm;  sCr: 0.96 mg/dL;  GFR: 65.63        Exams:     PHYSICAL EXAM:     GENERAL: vital signs recorded - well developed, well nourished;  well groomed;  no apparent distress;     EYES: PERRL, EOMI     RESPIRATORY: normal respiratory rate and pattern with no distress;     NEUROLOGIC: mental status: oriented to person, place, and time;  GROSSLY  INTACT     PSYCHIATRIC:  appropriate affect and demeanor; normal speech pattern; grossly normal memory;         Assessment:         E66.3   Overweight       I10   Essential (primary) hypertension       R60.1   Generalized edema       M54.89   Other dorsalgia           ORDERS:         Meds Prescribed:       [Recorded] triamterene-hydrochlorothiazid 37.5-25 mg oral capsule [take 1 capsule by oral route once daily]       [Recorded] methocarbamoL 750 mg oral tablet [take 1 tablet (750 mg) by oral route 3 times per day prn muscle spasm]       [Refilled] triamterene-hydrochlorothiazid 37.5-25 mg oral capsule [take 1 capsule by oral route once daily], #90 (ninety) capsules, Refills: 0 (zero)       [Refilled] methocarbamoL 750 mg oral tablet [take 1 tablet (750 mg) by oral route 3 times per day prn muscle spasm], #40 (forty) tablets, Refills: 0 (zero)         Procedures Ordered:       RFPT  Physical/Occupational Therapy Referral  (Send-Out)                      Plan:         OverweightCOUNSELED ON DIET AND EXERCISE    Telehealth: Verbal consent obtained for visit to occur via televideo conferencing; Staff, other than provider, present during telephone visit include only Dr Jacobson was present during the telehealth OV with patient         Essential (primary) hypertensionwell controlled        Generalized edemaedema onset with new probiotic, stop this med, add triamterene to hctz for swelling and she is to work on diet and exercise for wt loss and avoid salt in her diet        Other dorsalgiatreat with heat stretching and a muscle relaxant.  and referal to PT to eval and treat        REFERRALS:  Referral initiated to physical therapy ( [facility] ) for evaluation and treatment.            Prescriptions:       [Recorded] triamterene-hydrochlorothiazid 37.5-25 mg oral capsule [take 1 capsule by oral route once daily]       [Recorded] methocarbamoL 750 mg oral tablet [take 1 tablet (750 mg) by oral  route 3 times per day prn muscle spasm]       [Refilled] triamterene-hydrochlorothiazid 37.5-25 mg oral capsule [take 1 capsule by oral route once daily], #90 (ninety) capsules, Refills: 0 (zero)       [Refilled] methocarbamoL 750 mg oral tablet [take 1 tablet (750 mg) by oral route 3 times per day prn muscle spasm], #40 (forty) tablets, Refills: 0 (zero)           Orders:       RFPT  Physical/Occupational Therapy Referral  (Send-Out)                  Charge Capture:         Primary Diagnosis:     E66.3  Overweight           Orders:      81382  Office/outpatient visit; established patient, level 4  (In-House)              I10  Essential (primary) hypertension     R60.1  Generalized edema     M54.89  Other dorsalgia

## 2021-05-18 NOTE — PROGRESS NOTES
Iman Wiseman  1965     Office/Outpatient Visit    Visit Date: Tue, Dec 1, 2020 11:27 am    Provider: Tiffani Arango MD (Assistant: Erin Phillips MA)    Location: [facility]        Electronically signed by Tiffani Arango MD on  12/08/2020 01:37:17 PM                             Subjective:        CC: Esther is a 55 year old Black or  female.  pt says she hasnt been taking colestipol;         HPI:           Esther presents with essential (primary) hypertension.  Current nonpharmacologic treatment includes low sodium diet and exercise.  Her current cardiac medication regimen includes a diuretic ( HCTZ/triamterene ).  Compliance with treatment has been good; she takes her medication as directed, maintains her diet and exercise regimen, and follows up as directed.  Esther does not check her blood pressure other than at her clinic appointments.        vitamin d deficiency-she on 01259 units a week.      chronic iron deficiency anemia and she is on a MVI with iron.     ROS:     CONSTITUTIONAL:  Negative for chills, fatigue and fever.      E/N/T:  Negative for hearing problems, E/N/T pain, congestion, rhinorrhea, epistaxis, hoarseness, and dental problems.      CARDIOVASCULAR:  Negative for chest pain and dizziness.      RESPIRATORY:  Negative for recent cough, dyspnea and pleuritic chest pain.      GASTROINTESTINAL:  Positive for diarrhea ( due to IBS-resolved with probiotic ).   Negative for abdominal pain, constipation, nausea or vomiting.      GENITOURINARY:  Negative for dysuria, nocturia, polyuria, urinary incontinence and vaginal discharge.      INTEGUMENTARY/BREAST:  Negative for rash.      NEUROLOGICAL:  Negative for dizziness and headaches.      PSYCHIATRIC:  Negative for anxiety, depression, sadness, sleep disturbance and suicidal thoughts.          Past Medical History / Family History / Social History:         Last Reviewed on 12/01/2020 12:10 PM by Tiffani Arango  Ada    Past Medical History:             PAST MEDICAL HISTORY             GYNECOLOGICAL HISTORY:        Problems with pregnancy have included gestational diabetes.          PREVENTIVE HEALTH MAINTENANCE             COLORECTAL CANCER SCREENING: Up to date (colonoscopy q10y; sigmoidoscopy q5y; Cologuard q3y) was last done 19, Results are in chart; colonoscopy with the following abnormalities noted-- Polyp(s); The next colonoscopy is due       MAMMOGRAM: Done within last 2 years and results in are chart was last done 2020 with normal results         Surgical History:         Cholecystectomy: 2014;     : X 1;     Hysterectomy: Right oopherectomy;     Procedures:    Colonoscopy (  )    EGD ( 19 )    Laparoscopy (  exp lap ) 2019 abdominal abscess drainage/ post op wound infection with wound vac         Family History:     Father:  at age 77;  Cancer (unspecified type)     Mother: Hypertension;  Ovarian Cancer;  Type 2 Diabetes     Brother(s): 4 brother(s) total;  Hypertension     Sister(s): 3 sister(s) total;  Hypertension         Social History:     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  Non-drinker         Substance Abuse History:     Last Reviewed on 2020 01:10 PM by Sandra Velasquez        Mental Health History:     Last Reviewed on 2020 01:10 PM by Sandra Velasquez        Communicable Diseases (eg STDs):     Last Reviewed on 2020 01:10 PM by Sandra Velasquez        Allergies:     Last Reviewed on 2020 10:56 AM by Cuauhtemoc Yusuf    No Known Allergies.        Current Medications:     Last Reviewed on 2020 10:56 AM by Cuauhtemoc Yusuf    multivitamin with b complex     Probiotic     clonazePAM 0.5 mg oral tablet [take 1 tablet (0.5 mg) by oral  route 2 times per day prn anxiety]    cholecalciferol (vitamin D3) 125 mcg (5,000 unit) oral tablet [2 pills once a week]    triamterene-hydrochlorothiazid 37.5-25 mg oral capsule [take 1 capsule by oral route once daily]    colestipoL 1 gram oral tablet [take 1 tablet (1 gram) by oral route 2 times per day swallowing wholewith any liquid. Do not crush, chew and/or divide.]        Objective:        Vitals:         Current: 12/1/2020 11:34:40 AM    Ht:  5 ft, 5.75 in;  Wt: 243.4 lbs;  BMI: 39.6T: 95.9 F (temporal);  BP: 128/71 mm Hg (left arm, sitting);  P: 82 bpm (left arm (BP Cuff), sitting);  sCr: 0.96 mg/dL;  GFR: 82.25        Exams:     PHYSICAL EXAM:     GENERAL: vital signs recorded - well developed, well nourished;  no apparent distress;     EYES: PERRL, EOMI     E/N/T: OROPHARYNX:     NECK: range of motion is normal;     RESPIRATORY: normal appearance and symmetric expansion of chest wall; normal respiratory rate and pattern with no distress; normal breath sounds with no rales, rhonchi, wheezes or rubs;     CARDIOVASCULAR: normal rate; rhythm is regular;     GASTROINTESTINAL: nontender, nondistended; no hepatosplenomegaly or masses; no bruits;     MUSCULOSKELETAL: normal gait;     NEUROLOGIC: mental status: alert and oriented x 3;     PSYCHIATRIC: appropriate affect and demeanor; normal psychomotor function; normal speech pattern; normal thought and perception;         Assessment:         I10   Essential (primary) hypertension       E55.9   Vitamin D deficiency, unspecified       D50.8   Other iron deficiency anemias       Z12.31   Encounter for screening mammogram for malignant neoplasm of breast       K58.0   Irritable bowel syndrome with diarrhea       Z83.3   Family history of diabetes mellitus       R91.1   Solitary pulmonary nodule       N18.2   Chronic kidney disease, stage 2 (mild)           ORDERS:         Radiology/Test Orders:       [id number]  Screening digital breast tomosynthesis bi  (Send-Out)               Lab Orders:       40477  HTNLP - [facility] CMP AND LIPID: 82097, 47072  (Send-Out)            93765  VITD - [facility] Vitamin D, 25 Hydroxy  (Send-Out)            22010  VB12 - [facility] Vitamin B12  (Send-Out)            53590  BDCB2 - [facility] CBC w/o diff  (Send-Out)            87182  IRON - [facility] Iron, serum  (Send-Out)            00711  A1CEG - [facility] Hemoglobin A1C  (Send-Out)                      Plan:         Essential (primary) hypertensionwell controlled, due for labs    LABORATORY:  Labs ordered to be performed today include HTN/Lipid Panel: CMP, Lipid.      RECOMMENDATIONS given include: avoidance of caffeine, avoidance of cigarette smoke, keep blood pressure log as directed, healthy carb, high healthy protein and high fiber diet, avoid salt in diet, f/u every 6 months for BP checks and labs, and exercise 30 min 3-4 days a week.            Orders:       [id number]  HTNLP - [facility] CMP AND LIPID: 34763, 12682  (Send-Out)              Vitamin D deficiency, unspecifiedstable on 92914 units a week, due for labs    LABORATORY:  Labs ordered to be performed today include Vitamin D.            Orders:       76299  VITD - [facility] Vitamin D, 25 Hydroxy  (Send-Out)              Other iron deficiency anemias    LABORATORY:  Labs ordered to be performed today include B12, CBC W/O DIFF, and Iron Serum.            Orders:       80308  VB12 - [facility] Vitamin B12  (Send-Out)            59760  BDCB2 - [facility] CBC w/o diff  (Send-Out)            14053  IRON - [facility] Iron, serum  (Send-Out)              Encounter for screening mammogram for malignant neoplasm of breast          Orders:       73836  Screening digital breast tomosynthesis bi  (Send-Out)              Family history of diabetes mellitus    LABORATORY:  Labs ordered to be performed today include HgbA1C.            Orders:       44407  A1CEG - [facility] Hemoglobin A1C  (Send-Out)              Solitary pulmonary nodulestable, she has f/u [facility] in [place]        Chronic kidney disease, stage 2 (mild)stable,  she has f/u nephrology Dr Reynoso in March            Patient Recommendations:        For  Essential (primary) hypertension:    Try to avoid or reduce the amount of caffeine intake. Avoid cigarette smoke. Keep a daily blood pressure log and report elevated blood pressure to provider as directed. Drink plenty of fluids.  Fever increases the loss of fluids and can lead to dehydration.              Charge Capture:         Primary Diagnosis:     I10  Essential (primary) hypertension           Orders:      46102  Office/outpatient visit; established patient, level 4  (In-House)              E55.9  Vitamin D deficiency, unspecified     D50.8  Other iron deficiency anemias     Z12.31  Encounter for screening mammogram for malignant neoplasm of breast     K58.0  Irritable bowel syndrome with diarrhea     Z83.3  Family history of diabetes mellitus     R91.1  Solitary pulmonary nodule     N18.2  Chronic kidney disease, stage 2 (mild)

## 2021-06-09 ENCOUNTER — E-VISIT (OUTPATIENT)
Dept: FAMILY MEDICINE CLINIC | Facility: TELEHEALTH | Age: 56
End: 2021-06-09

## 2021-06-09 ENCOUNTER — TELEPHONE (OUTPATIENT)
Dept: FAMILY MEDICINE CLINIC | Age: 56
End: 2021-06-09

## 2021-06-09 DIAGNOSIS — J06.9 UPPER RESPIRATORY TRACT INFECTION, UNSPECIFIED TYPE: Primary | ICD-10-CM

## 2021-06-09 PROCEDURE — 99422 OL DIG E/M SVC 11-20 MIN: CPT | Performed by: NURSE PRACTITIONER

## 2021-06-09 RX ORDER — ETODOLAC 500 MG/1
TABLET, FILM COATED ORAL
COMMUNITY
Start: 2021-03-28 | End: 2021-10-14 | Stop reason: ALTCHOICE

## 2021-06-09 RX ORDER — TRIAMTERENE AND HYDROCHLOROTHIAZIDE 37.5; 25 MG/1; MG/1
CAPSULE ORAL
COMMUNITY
Start: 2021-04-08 | End: 2021-07-15

## 2021-06-09 RX ORDER — HYDROCODONE BITARTRATE AND ACETAMINOPHEN 5; 325 MG/1; MG/1
TABLET ORAL
COMMUNITY
Start: 2021-03-28 | End: 2021-10-14

## 2021-06-09 RX ORDER — FLUTICASONE PROPIONATE 50 MCG
2 SPRAY, SUSPENSION (ML) NASAL DAILY
Qty: 18.2 ML | Refills: 0 | Status: SHIPPED | OUTPATIENT
Start: 2021-06-09 | End: 2021-07-15

## 2021-06-09 NOTE — TELEPHONE ENCOUNTER
Caller: Iman Wiseman    Relationship: Self    Best call back number: [phone]  What medication are you requesting: ANTIBIOTIC     What are your current symptoms: RUNNY NOSE CONGESTION UPPER RESP.     How long have you been experiencing symptoms: 2 DAY   Have you had these symptoms before:    [x] Yes  [] No    Have you been treated for these symptoms before:   [x] Yes  [] No    If a prescription is needed, what is your preferred pharmacy and phone number: [facility] [address] [place] [address] - [facility] [address]  - [phone]  - [phone]     Additional notes:  HAS TAKEN OTC MEDS

## 2021-06-09 NOTE — PROGRESS NOTES
Iman Wiseman    1965  [id number]    I have reviewed the e-Visit questionnaire and patient's answers, my assessment and plan are as follows:      HPI  Iman Wiseman is a 56 y.o. with a 2 day history of nasal congestion, facial pain and pressure, sore/scratchy throat.  Denies facial tenderness, colored nasal discharge, ear pain, headache, fever, cough.  She has been taking Zyrtec x 2 days with no relief    Review of Systems - Negative except symptoms listed in HPI      Diagnoses and all orders for this visit:    1. Upper respiratory tract infection, unspecified type (Primary)  -     Chlorcyclizine-Pseudoephed 25-60 MG tablet; Take 1 tablet by mouth 3 (Three) Times a Day As Needed (nasal congestion).  Dispense: 42 tablet; Refill: 0  -     fluticasone (FLONASE) 50 MCG/ACT nasal spray; 2 sprays into the nostril(s) as directed by provider Daily.  Dispense: 18.2 mL; Refill: 0    --Chlorcyclizine-Pseudoephed (Stahist AD) is for congestion and allergies; take as prescribed  --Fluticasone (Flonase) is a steroid nasal spray that will decrease inflammation in nasal passages and sinuses; take as prescribed  --advise--increased fluids to thin any secretions; tylenol or ibuprofen for pain; warm compresses to areas of sinus pain  --if no improvement in 7-10 days, will need follow-up for further evaluation    Any medications prescribed have been sent electronically to   [facility]  - [phone] [facility] [phone] FX  [address]  [facility] [address]  Phone: [phone] Fax: [phone]      Time Documentation  Counseled patient  Counseling topics: diagnosis, treatment options and return instructions  Total encounter time: counseling time more than 50% of visit: 20 minutes        RANDALL Wells  06/09/21  10:31 EDT

## 2021-06-09 NOTE — PATIENT INSTRUCTIONS

## 2021-07-01 VITALS
HEIGHT: 66 IN | HEART RATE: 84 BPM | SYSTOLIC BLOOD PRESSURE: 129 MMHG | WEIGHT: 235.4 LBS | DIASTOLIC BLOOD PRESSURE: 74 MMHG | TEMPERATURE: 98.4 F | BODY MASS INDEX: 37.83 KG/M2

## 2021-07-01 VITALS
SYSTOLIC BLOOD PRESSURE: 114 MMHG | HEART RATE: 95 BPM | BODY MASS INDEX: 36.48 KG/M2 | WEIGHT: 227 LBS | TEMPERATURE: 98 F | OXYGEN SATURATION: 100 % | DIASTOLIC BLOOD PRESSURE: 60 MMHG | HEIGHT: 66 IN

## 2021-07-01 VITALS
HEART RATE: 77 BPM | TEMPERATURE: 97.5 F | DIASTOLIC BLOOD PRESSURE: 79 MMHG | SYSTOLIC BLOOD PRESSURE: 126 MMHG | WEIGHT: 228 LBS | BODY MASS INDEX: 36.64 KG/M2 | HEIGHT: 66 IN

## 2021-07-01 VITALS
SYSTOLIC BLOOD PRESSURE: 131 MMHG | HEART RATE: 78 BPM | WEIGHT: 230.9 LBS | HEIGHT: 66 IN | DIASTOLIC BLOOD PRESSURE: 80 MMHG | TEMPERATURE: 97.3 F | BODY MASS INDEX: 37.11 KG/M2

## 2021-07-01 VITALS
HEIGHT: 66 IN | SYSTOLIC BLOOD PRESSURE: 140 MMHG | DIASTOLIC BLOOD PRESSURE: 80 MMHG | HEART RATE: 80 BPM | BODY MASS INDEX: 37.67 KG/M2 | TEMPERATURE: 98.5 F | WEIGHT: 234.4 LBS

## 2021-07-02 VITALS
WEIGHT: 230 LBS | TEMPERATURE: 97.3 F | DIASTOLIC BLOOD PRESSURE: 83 MMHG | BODY MASS INDEX: 36.96 KG/M2 | HEIGHT: 66 IN | SYSTOLIC BLOOD PRESSURE: 127 MMHG

## 2021-07-02 VITALS
WEIGHT: 221.2 LBS | BODY MASS INDEX: 35.55 KG/M2 | HEIGHT: 66 IN | OXYGEN SATURATION: 100 % | DIASTOLIC BLOOD PRESSURE: 61 MMHG | TEMPERATURE: 97.4 F | SYSTOLIC BLOOD PRESSURE: 117 MMHG | HEART RATE: 102 BPM

## 2021-07-02 VITALS
DIASTOLIC BLOOD PRESSURE: 72 MMHG | TEMPERATURE: 98.1 F | SYSTOLIC BLOOD PRESSURE: 114 MMHG | BODY MASS INDEX: 36.32 KG/M2 | HEART RATE: 88 BPM | WEIGHT: 226 LBS | HEIGHT: 66 IN

## 2021-07-02 VITALS
HEART RATE: 82 BPM | HEIGHT: 66 IN | WEIGHT: 243.4 LBS | SYSTOLIC BLOOD PRESSURE: 128 MMHG | DIASTOLIC BLOOD PRESSURE: 71 MMHG | BODY MASS INDEX: 39.12 KG/M2 | TEMPERATURE: 95.9 F

## 2021-07-02 VITALS
HEIGHT: 66 IN | RESPIRATION RATE: 16 BRPM | SYSTOLIC BLOOD PRESSURE: 122 MMHG | BODY MASS INDEX: 37.41 KG/M2 | HEART RATE: 86 BPM | DIASTOLIC BLOOD PRESSURE: 86 MMHG

## 2021-07-02 VITALS
BODY MASS INDEX: 38.57 KG/M2 | TEMPERATURE: 98 F | HEIGHT: 66 IN | RESPIRATION RATE: 16 BRPM | SYSTOLIC BLOOD PRESSURE: 122 MMHG | WEIGHT: 240 LBS | DIASTOLIC BLOOD PRESSURE: 78 MMHG

## 2021-07-02 VITALS
HEIGHT: 66 IN | DIASTOLIC BLOOD PRESSURE: 55 MMHG | TEMPERATURE: 96.9 F | HEART RATE: 90 BPM | WEIGHT: 243.2 LBS | SYSTOLIC BLOOD PRESSURE: 116 MMHG | BODY MASS INDEX: 39.08 KG/M2

## 2021-07-15 ENCOUNTER — OFFICE VISIT (OUTPATIENT)
Dept: FAMILY MEDICINE CLINIC | Age: 56
End: 2021-07-15

## 2021-07-15 ENCOUNTER — LAB (OUTPATIENT)
Dept: LAB | Facility: HOSPITAL | Age: 56
End: 2021-07-15

## 2021-07-15 VITALS
TEMPERATURE: 98 F | SYSTOLIC BLOOD PRESSURE: 133 MMHG | WEIGHT: 237.4 LBS | HEIGHT: 66 IN | HEART RATE: 80 BPM | BODY MASS INDEX: 38.15 KG/M2 | DIASTOLIC BLOOD PRESSURE: 75 MMHG

## 2021-07-15 DIAGNOSIS — E55.9 VITAMIN D DEFICIENCY: ICD-10-CM

## 2021-07-15 DIAGNOSIS — M25.562 CHRONIC PAIN OF LEFT KNEE: ICD-10-CM

## 2021-07-15 DIAGNOSIS — M25.512 ACUTE PAIN OF LEFT SHOULDER: ICD-10-CM

## 2021-07-15 DIAGNOSIS — E53.8 B12 DEFICIENCY: ICD-10-CM

## 2021-07-15 DIAGNOSIS — I10 ESSENTIAL HYPERTENSION: Primary | ICD-10-CM

## 2021-07-15 DIAGNOSIS — Z83.3 FAMILY HISTORY OF DIABETES MELLITUS: ICD-10-CM

## 2021-07-15 DIAGNOSIS — I10 ESSENTIAL HYPERTENSION: ICD-10-CM

## 2021-07-15 DIAGNOSIS — G89.29 CHRONIC PAIN OF LEFT KNEE: ICD-10-CM

## 2021-07-15 LAB
25(OH)D3 SERPL-MCNC: 37.5 NG/ML
ALBUMIN SERPL-MCNC: 4 G/DL (ref 3.5–5.2)
ALBUMIN/GLOB SERPL: 1.3 G/DL
ALP SERPL-CCNC: 113 U/L (ref 39–117)
ALT SERPL W P-5'-P-CCNC: 33 U/L (ref 1–33)
ANION GAP SERPL CALCULATED.3IONS-SCNC: 10.7 MMOL/L (ref 5–15)
AST SERPL-CCNC: 23 U/L (ref 1–32)
BASOPHILS # BLD AUTO: 0.07 10*3/MM3 (ref 0–0.2)
BASOPHILS NFR BLD AUTO: 0.8 % (ref 0–1.5)
BILIRUB SERPL-MCNC: 0.5 MG/DL (ref 0–1.2)
BUN SERPL-MCNC: 12 MG/DL (ref 6–20)
BUN/CREAT SERPL: 12.9 (ref 7–25)
CALCIUM SPEC-SCNC: 10 MG/DL (ref 8.6–10.5)
CHLORIDE SERPL-SCNC: 101 MMOL/L (ref 98–107)
CHOLEST SERPL-MCNC: 168 MG/DL (ref 0–200)
CO2 SERPL-SCNC: 29.3 MMOL/L (ref 22–29)
CREAT SERPL-MCNC: 0.93 MG/DL (ref 0.57–1)
DEPRECATED RDW RBC AUTO: 43.3 FL (ref 37–54)
EOSINOPHIL # BLD AUTO: 0.18 10*3/MM3 (ref 0–0.4)
EOSINOPHIL NFR BLD AUTO: 2.1 % (ref 0.3–6.2)
ERYTHROCYTE [DISTWIDTH] IN BLOOD BY AUTOMATED COUNT: 13.1 % (ref 12.3–15.4)
GFR SERPL CREATININE-BSD FRML MDRD: 76 ML/MIN/1.73
GLOBULIN UR ELPH-MCNC: 3.2 GM/DL
GLUCOSE SERPL-MCNC: 116 MG/DL (ref 65–99)
HBA1C MFR BLD: 5.6 % (ref 4.8–5.6)
HCT VFR BLD AUTO: 38 % (ref 34–46.6)
HDLC SERPL-MCNC: 43 MG/DL (ref 40–60)
HGB BLD-MCNC: 12.5 G/DL (ref 12–15.9)
IMM GRANULOCYTES # BLD AUTO: 0.05 10*3/MM3 (ref 0–0.05)
IMM GRANULOCYTES NFR BLD AUTO: 0.6 % (ref 0–0.5)
IRON 24H UR-MRATE: 57 MCG/DL (ref 37–145)
IRON SATN MFR SERPL: 19 % (ref 20–50)
LDLC SERPL CALC-MCNC: 107 MG/DL (ref 0–100)
LDLC/HDLC SERPL: 2.47 {RATIO}
LYMPHOCYTES # BLD AUTO: 1.7 10*3/MM3 (ref 0.7–3.1)
LYMPHOCYTES NFR BLD AUTO: 19.5 % (ref 19.6–45.3)
MCH RBC QN AUTO: 29.6 PG (ref 26.6–33)
MCHC RBC AUTO-ENTMCNC: 32.9 G/DL (ref 31.5–35.7)
MCV RBC AUTO: 90 FL (ref 79–97)
MONOCYTES # BLD AUTO: 0.34 10*3/MM3 (ref 0.1–0.9)
MONOCYTES NFR BLD AUTO: 3.9 % (ref 5–12)
NEUTROPHILS NFR BLD AUTO: 6.37 10*3/MM3 (ref 1.7–7)
NEUTROPHILS NFR BLD AUTO: 73.1 % (ref 42.7–76)
PLATELET # BLD AUTO: 246 10*3/MM3 (ref 140–450)
PMV BLD AUTO: 9.3 FL (ref 6–12)
POTASSIUM SERPL-SCNC: 3.3 MMOL/L (ref 3.5–5.2)
PROT SERPL-MCNC: 7.2 G/DL (ref 6–8.5)
RBC # BLD AUTO: 4.22 10*6/MM3 (ref 3.77–5.28)
SODIUM SERPL-SCNC: 141 MMOL/L (ref 136–145)
TIBC SERPL-MCNC: 294 MCG/DL (ref 298–536)
TRANSFERRIN SERPL-MCNC: 197 MG/DL (ref 200–360)
TRIGL SERPL-MCNC: 95 MG/DL (ref 0–150)
VIT B12 BLD-MCNC: 557 PG/ML (ref 211–946)
VLDLC SERPL-MCNC: 18 MG/DL (ref 5–40)
WBC # BLD AUTO: 8.71 10*3/MM3 (ref 3.4–10.8)

## 2021-07-15 PROCEDURE — 80061 LIPID PANEL: CPT

## 2021-07-15 PROCEDURE — 85025 COMPLETE CBC W/AUTO DIFF WBC: CPT

## 2021-07-15 PROCEDURE — 83540 ASSAY OF IRON: CPT

## 2021-07-15 PROCEDURE — 36415 COLL VENOUS BLD VENIPUNCTURE: CPT

## 2021-07-15 PROCEDURE — 84466 ASSAY OF TRANSFERRIN: CPT

## 2021-07-15 PROCEDURE — 80053 COMPREHEN METABOLIC PANEL: CPT

## 2021-07-15 PROCEDURE — 82306 VITAMIN D 25 HYDROXY: CPT

## 2021-07-15 PROCEDURE — 99214 OFFICE O/P EST MOD 30 MIN: CPT | Performed by: FAMILY MEDICINE

## 2021-07-15 PROCEDURE — 83036 HEMOGLOBIN GLYCOSYLATED A1C: CPT | Performed by: FAMILY MEDICINE

## 2021-07-15 PROCEDURE — 82607 VITAMIN B-12: CPT

## 2021-07-15 RX ORDER — HYDROCHLOROTHIAZIDE 25 MG/1
25 TABLET ORAL DAILY
Qty: 90 TABLET | Refills: 1 | Status: SHIPPED | OUTPATIENT
Start: 2021-07-15 | End: 2021-10-14 | Stop reason: SDUPTHER

## 2021-07-15 NOTE — PROGRESS NOTES
Iman Wiseman presents to [facility].    Chief Complaint: f/u for BP and med refills  Subjective       History of Present Illness:  CORRINA Santana is in today for her hypertension.  She is overall doing well with  hydrochlorothiazide.  Her blood pressure looks great at 133/75.  She has been dealing some chronic left knee pain is been going to physical therapy and takes etodolac as needed and she is actually doing quite well and has completed physical therapy.  She medication recently and her left shoulder locked up on her so she had to take some of her etodolac and hydrocodone as needed but she is doing better with that now.  Range of motion shoulder is good.  She also has vitamin D deficiency and takes 5000 units twice a week and is on B complex for B12 deficiency  Review of Systems:  Review of Systems   Constitutional: Negative for chills, fatigue and fever.   HENT: Positive for rhinorrhea. Negative for ear pain, sinus pressure and sore throat.    Eyes: Negative for blurred vision and visual disturbance.   Respiratory: Negative for cough, shortness of breath and wheezing.    Cardiovascular: Negative for chest pain and palpitations.   Gastrointestinal: Negative for abdominal pain, constipation, diarrhea, nausea and GERD.   Endocrine: Negative for polyuria.   Genitourinary: Negative for dysuria and hematuria.   Musculoskeletal: Negative for arthralgias and myalgias.   Skin: Negative for rash.   Neurological: Negative for dizziness, weakness and headache.   Psychiatric/Behavioral: Negative for depressed mood. The patient is not nervous/anxious.         Objective   Medical History:  Past Medical History:   • Acute kidney failure, unspecified (CMS/HCC)   • Acute pharyngitis   • Anxiety disorder   • Chronic kidney disease, stage 2 (mild)   • Cutaneous abscess of abdominal wall   • Dyspnea, unspecified   • Elevated white blood cell count, unspecified   • Essential (primary) hypertension   •  Family history of diabetes mellitus   • Generalized edema   • Hypocalcemia   • Infection following a procedure, unspecified, subsequent encounter   • Iron deficiency anemia   • Irritable bowel syndrome with diarrhea   • Mild intermittent asthma   • Other alopecia areata   • Other dorsalgia   • Other fatigue   • Other iron deficiency anemias   • Other long term (current) drug therapy   • Other ovarian cyst, left side   • Overweight   • Shortness of breath   • Solitary pulmonary nodule   • Unilateral primary osteoarthritis, left knee   • Vitamin D deficiency, unspecified     Past Surgical History:   •  SECTION   • CHOLECYSTECTOMY   • COLONOSCOPY   • ENDOSCOPY   • HYSTERECTOMY    right oopherectomy    • LAPAROSCOPY WITH LASER    abdominal abscess drainage/post op wound infection with wound vac      Family History   Problem Relation Age of Onset   • Hypertension Mother    • Ovarian cancer Mother    • Diabetes type II Mother    • Cancer Father    • Hypertension Sister    • Hypertension Brother      Social History     Tobacco Use   • Smoking status: Never Smoker   Substance Use Topics   • Alcohol use: Not Currently       Health Maintenance Due   Topic Date Due   • ANNUAL PHYSICAL  Never done   • ZOSTER VACCINE (1 of 2) Never done   • HEPATITIS C SCREENING  Never done   • PAP SMEAR  Never done        Immunization History   Administered Date(s) Administered   • COVID-19 (PFIZER) 2021, 2021   • Hepatitis A 2016   • Hepatitis B 2016   • Tdap 2012       No Known Allergies     Medications:  Current Outpatient Medications on File Prior to Visit   Medication Sig   • etodolac (LODINE) 500 MG tablet    • HYDROcodone-acetaminophen (NORCO) 5-325 MG per tablet    • [DISCONTINUED] Chlorcyclizine-Pseudoephed 25-60 MG tablet Take 1 tablet by mouth 3 (Three) Times a Day As Needed (nasal congestion).   • [DISCONTINUED] triamterene-hydrochlorothiazide (DYAZIDE) 37.5-25 MG per capsule    • [DISCONTINUED]  "fluticasone (FLONASE) 50 MCG/ACT nasal spray 2 sprays into the nostril(s) as directed by provider Daily.     No current facility-administered medications on file prior to visit.       Vital Signs:   /75 (BP Location: Left arm, Patient Position: Sitting)   Pulse 80   Temp 98 °F (36.7 °C) (Oral)   Ht 167 cm (65.75\")   Wt 108 kg (237 lb 6.4 oz)   BMI 38.61 kg/m²       Physical Exam:  Physical Exam  Constitutional:       General: She is not in acute distress.     Appearance: She is normal weight. She is not ill-appearing.   HENT:      Head: Normocephalic.      Right Ear: Tympanic membrane normal. There is no impacted cerumen.      Left Ear: Tympanic membrane normal. There is no impacted cerumen.      Mouth/Throat:      Mouth: Mucous membranes are moist.      Pharynx: Oropharynx is clear.   Eyes:      Extraocular Movements: Extraocular movements intact.      Pupils: Pupils are equal, round, and reactive to light.   Neck:      Vascular: No carotid bruit.   Cardiovascular:      Rate and Rhythm: Normal rate and regular rhythm.      Pulses: Normal pulses.      Heart sounds: No murmur heard.     Pulmonary:      Effort: Pulmonary effort is normal.      Breath sounds: No wheezing, rhonchi or rales.   Abdominal:      General: Bowel sounds are normal.      Palpations: Abdomen is soft.      Tenderness: There is no abdominal tenderness.   Musculoskeletal:         General: No swelling. Normal range of motion.      Cervical back: No rigidity or tenderness.   Lymphadenopathy:      Cervical: No cervical adenopathy.   Skin:     General: Skin is warm and dry.   Neurological:      Mental Status: She is alert.      Gait: Gait normal.   Psychiatric:         Mood and Affect: Mood normal.         Behavior: Behavior normal.         Judgment: Judgment normal.         Result Review      The following data was reviewed by Tiffani Arango MD on 07/15/2021.  Lab Results   Component Value Date    WBC 8.13 04/09/2021    HGB 13.50 " 04/09/2021    HCT 41.1 04/09/2021    MCV 90.7 04/09/2021    .00 04/09/2021     Lab Results   Component Value Date    BUN 22 04/09/2021    CREATININE 1.25 (H) 04/09/2021    BCR 18 04/09/2021    K 3.7 04/09/2021    CO2 25 04/09/2021    CALCIUM 9.7 04/09/2021    ALBUMIN 4.0 04/09/2021    LABIL2 1.1 (L) 12/01/2020    AST 25 12/01/2020    ALT 28 12/01/2020     Lab Results   Component Value Date    CHLPL 174 12/01/2020    TRIG 112 12/01/2020    HDL 49 12/01/2020     (H) 12/01/2020     Lab Results   Component Value Date    TSH 2.070 05/23/2020     Lab Results   Component Value Date    HGBA1C 5.8 (H) 12/01/2020     No results found for: PSA                    Assessment and Plan:          Diagnoses and all orders for this visit:    1. Essential hypertension (Primary)  -     hydroCHLOROthiazide (HYDRODIURIL) 25 MG tablet; Take 1 tablet by mouth Daily.  Dispense: 90 tablet; Refill: 1  -     Comprehensive metabolic panel; Future  -     Lipid panel; Future    2. Acute pain of left shoulder    3. B12 deficiency  -     CBC and Differential; Future  -     Vitamin B12; Future  -     Iron Profile; Future    4. Vitamin D deficiency  -     Vitamin D 25 hydroxy; Future    5. Family history of diabetes mellitus  -     Hemoglobin A1c    6. Chronic pain of left knee    Other orders  -     Cholecalciferol (Vitamin D-3) 125 MCG (5000 UT) tablet; Take 1 tablet by mouth 2 (Two) Times a Week.  Dispense: 50 tablet; Refill: 1      Esther is doing extremely well.  Her blood pressure is well controlled with hydrochlorothiazide.  She is on vitamin D for vitamin D deficiency and B12 for B12 deficiency and needs her anemia profile rechecked today.  Her shoulder pain is improved and I recommend etodolac as needed.  Her chronic left knee pain has resolved.  She is worried about diabetes so I will check a hemoglobin A1c on her today because she has a strong family history of diabetes.  Meds refilled she tolerates meds well.  Follow Up   No  follow-ups on file.  Patient was given instructions and counseling regarding her condition or for health maintenance advice. Please see specific information pulled into the AVS if appropriate.

## 2021-07-19 ENCOUNTER — TELEPHONE (OUTPATIENT)
Dept: FAMILY MEDICINE CLINIC | Age: 56
End: 2021-07-19

## 2021-07-19 DIAGNOSIS — R06.81 APNEA: Primary | ICD-10-CM

## 2021-07-19 NOTE — TELEPHONE ENCOUNTER
Caller: Iman Wiseman    Relationship: Self    Best call back number: [phone]     What is the best time to reach you: ANY    What was the call regarding: PT CALLED TO REQUEST A SLEEP STUDY AND WANTED TO TALK TO DR ADAIR, PLEASE ADVISE, THANK YOU.    Do you require a callback: YES

## 2021-07-21 DIAGNOSIS — D50.8 IRON DEFICIENCY ANEMIA SECONDARY TO INADEQUATE DIETARY IRON INTAKE: Primary | ICD-10-CM

## 2021-07-28 RX ORDER — FERROUS SULFATE 325(65) MG
TABLET ORAL
Qty: 90 TABLET | Refills: 0 | Status: SHIPPED | OUTPATIENT
Start: 2021-07-28 | End: 2022-10-19 | Stop reason: SDUPTHER

## 2021-07-28 RX ORDER — FERROUS SULFATE 325(65) MG
325 TABLET ORAL
COMMUNITY
End: 2021-12-10 | Stop reason: SDUPTHER

## 2021-08-02 RX ORDER — FERROUS SULFATE 325(65) MG
325 TABLET ORAL
Qty: 90 TABLET | Refills: 0 | OUTPATIENT
Start: 2021-08-02

## 2021-08-18 ENCOUNTER — OFFICE VISIT (OUTPATIENT)
Dept: SLEEP MEDICINE | Facility: HOSPITAL | Age: 56
End: 2021-08-18

## 2021-08-18 VITALS
TEMPERATURE: 96 F | HEART RATE: 85 BPM | WEIGHT: 237 LBS | SYSTOLIC BLOOD PRESSURE: 116 MMHG | HEIGHT: 66 IN | DIASTOLIC BLOOD PRESSURE: 72 MMHG | OXYGEN SATURATION: 97 % | BODY MASS INDEX: 38.09 KG/M2

## 2021-08-18 DIAGNOSIS — E66.9 CLASS 2 OBESITY: ICD-10-CM

## 2021-08-18 DIAGNOSIS — G47.10 HYPERSOMNIA: ICD-10-CM

## 2021-08-18 DIAGNOSIS — R06.83 SNORING: ICD-10-CM

## 2021-08-18 DIAGNOSIS — G47.30 OBSERVED SLEEP APNEA: Primary | ICD-10-CM

## 2021-08-18 DIAGNOSIS — R51.9 MORNING HEADACHE: ICD-10-CM

## 2021-08-18 DIAGNOSIS — G47.8 NON-RESTORATIVE SLEEP: ICD-10-CM

## 2021-08-18 PROBLEM — E66.812 CLASS 2 OBESITY: Status: ACTIVE | Noted: 2021-08-18

## 2021-08-18 PROCEDURE — 99244 OFF/OP CNSLTJ NEW/EST MOD 40: CPT | Performed by: INTERNAL MEDICINE

## 2021-08-18 PROCEDURE — G0463 HOSPITAL OUTPT CLINIC VISIT: HCPCS

## 2021-08-18 NOTE — PROGRESS NOTES
[facility] [address] [address]  [place]   [place] [address]  Phone: [phone]  Fax: [phone]      Iman Wiseman  1965  56 y.o.  female      Referring physician/provider and PCP Tiffani Arango MD    Type of service: Initial Sleep Medicine Consult.  Date of service: 8/18/2021      Chief Complaint   Patient presents with   • Witnessed Apnea   • Snoring   • Fatigue   • Non-restorative Sleep   • Daytime Sleepiness   • Morning Headaches       History of present illness;  Thank you for asking to see Iman Wiseman, 56 y.o.. The patient was seen today on 8/18/2021 at [facility].  The patient presents today with symptoms of snoring, nonrestorative sleep and witnessed apneas. The symptoms are present for 1 to 2 years and they are persistent in nature.  The snoring is present in all positions and it is loud.  Has no history of prior sleep evaluation and sleep studies. Patient has no prior surgery namely tonsillectomy, nasal surgery and UPPP.   She works as a  and feels tired.    Patient gives the following sleep history.  Sleep schedule:  Bedtime: 10 PM  Wake time: Between 530 and 6:30 AM  Normally takes about 5-10 minutes to fall asleep  Average hours of sleep 6  Number of naps per day none  Symptoms  In addition to snoring, nonrestorative sleep and witnessed apneas patient gives the following associated symptoms.  Have you ever awakened gasping for breath, coughing, choking: Yes   Change in weight,  Yes gained about 20 pounds  Morning headaches  Yes   Awaken with a sore throat or dry mouth  Yes   Leg jerking at night:  Yes   Crawly feeling/urge sensation to move in the legs: No   Teeth grinding:No   Have you ever awakened at night with a sour taste or burning sensation in your chest:  Yes   Do you have muscle weakness with laughing or anger or sleep paralysis:  No   Have you ever felt paralyzed while going to sleep or waking up:  No   Sleepwalking,  "nightmares, No   Nocturia (urination at night): 1 times per night  Memory Problem:No     MEDICAL CONDITIONS (PMH)  1. Hypertension  2. Low vitamin D 3  3. Low iron    Social history:  Do you drive a commercial vehicle:  No   Shift work:  No   Tobacco use:  No   Alcohol use: 0 per week  Caffeinated drinks: 1    Family Hx (your parents and siblings)  1. Obesity  2. Sleep apnea with both sister and brother    Medications: reviewed    Review of systems:  Sleep: Positve for snoring,witnessed apnea and daytime excessive sleepiness with Vallejo Sleepiness Scale of Total score: 10   Kidney/ Bladder  Difficulty In Urination: negative  Bed Wetting: negative  Frequent Urination: negative  HEENT  Recurrent Nose Bleeds: negative  Ear pain: negative  Sores In Mouth: negative  Persistent Hoarseness: negative  Nasal Congestion: negative  Post Nasal Drip: negative  Musculoskeletal:  Neck Pain: negative  Temporomandibular Joint Pain: negative  General:  Fever: negative  Fatigue: negative  Vardiovascular:  Irregular Heartbeat: negative  Swollen Ankles Or Legs: negative  Respiratory:  Shortness Of Breath: negative  Wheezing: negative  Neuro/Paych:  Fainting Spells: negative  Dizziness: negative  Anxiety: negative  Depression: negative  Gastrointestinal:  Problem Swallowing: negative  Frequent Heartburn: negative  Abdominal Bloating: negative  Skin:  Rash: negative  Change In Nails: negative  Endocrine:  Excessive Thirst: negative  Always Too Cold: negative  Always Too Warm: negative  Hem/Lymphatic:  Swollen Glands: negative  Burses/ Bleeds Easily: negative    Physical exam:  CONSTITUTINONAL:  Vitals:    08/18/21 1300   BP: 116/72   Pulse: 85   Temp: 96 °F (35.6 °C)   SpO2: 97%   Weight: 108 kg (237 lb)   Height: 167.6 cm (66\")    Body mass index is 38.25 kg/m².   HEAD: atraumatic, normocephalic   EYES:pupils are round, equal and reacting to light and accommodation, conjunctiva normal  NOSE:no nasal septal defects, nasal passages are " clear, no nasal polyps,   THOART: tonsils are not enlarged, tongue normal size, oral airway Mallampati class 3  NECK:Neck Circumference: 16 inches, trachea is in the midline, thyroid not enlarged  RESPIRATORY SYSTEM: Breath sounds are normal, there are no wheezes  CARDIOVASULAR SYSTEM: Heart sounds are regular rhythm and normal rate, there are no murmurs or thrills, no edema  GASTROINTESTIAN: Soft and non-tender,liver not enlarged, no evidence of ascites  MUSCULOSKELETAL SYSTEM: Full range of motion's in all the 4 extremities, neck movement not restricted, temporomandibular joint movement normal and no tenderness  SKIN: Warm and moist, no cyanosis, no clubbing,  NEUROLOGICAL SYSTEM: Oriented x 3, no gross neurological defects, No speech defect, gait is normal  PYSCHATRIC SYSTEM: Mood is normal, thought content is normal    Labs reviewed.  TSH Results:     Most Recent A1C    HGBA1C Most Recent 7/15/21   Hemoglobin A1C 5.60              Assessment and plan:  · Witnessed apneas,(R06.81) patient's symptoms and examination is consistent with sleep apnea (G47.30). I have talked to the patient about the signs and symptoms of sleep apnea. In addition, I have also discussed pathophysiology of sleep apnea.  I also discussed the complications of untreated sleep apnea including effects on hypertension, diabetes mellitus and nonrestorative sleep with hypersomnia which can increase risk for motor vehicle accidents.  Untreated sleep apnea is also a risk factor for development of atrial fibrillation, pulmonary hypertension and stroke.  Discussed in detail of various testing methods including home-based and lab based sleep studies.  Based on history and physical examination and other comorbidities the most appropriate study is home sleep test.  The order for the sleep study is placed in [facility].  The test will be scheduled after approval from insurance. Treatment and management will be discussed after the test is completed.  Patient  was given opportunity to ask questions and all the questions were answered.   · Snoring (R06.83), snoring is the sound created by turbulent airflow vibrating upper airway soft tissue due to limitation of inspiratory airflow. I have also discussed factors affecting snoring including sleep deprivation, sleeping on the back and alcohol ingestion. To minimize snoring, patient is advised to have adequate sleep, sleep on the side and avoid alcohol and sedative medications before bedtime  · Daytime excessive sleepiness .  It was assessed with Blackstone Sleepiness Scale of Total score: 10.  There are many causes for daytime excessive sleepiness including sleep depression, shiftwork syndrome, depression and other medical disorders including heart, kidney and liver failure.  The most serious cause of excessive sleepiness is due to neurological conditions like narcolepsy/cataplexy.  But the most common cause of excessive sleepiness is due to sleep apnea with frequent awakenings during sleep time.  I have discussed safety of driving and to remain vigilant while driving.  · Obesity class 2, patient's BMI is Body mass index is 38.25 kg/m².. I have discussed the relationship between weight and sleep apnea.There is direct correlation between weight and severity of sleep apnea.  Weight reduction is encouraged, as it is going to reduce the severity of sleep apnea. I have also discussed with the patient diet and exercise to achieve ideal body weight.    I have also discussed with the patient the following  • Sleep hygiene: Maintaining a regular bedtime and wake time, not to watch television or work in bed, limit caffeine-containing beverages before bed time and avoid naps during the day  • Adequate amount of sleep.  Generally most people needs about 7 to 8 hours of sleep.    Return for 31 to 90 days after PAP setup with down load..  Patient's questions were answered      I once again thank you for asking me to see this patient in  consultation and I have forwarded my opinion and treatment plan.  Please do not hesitate to call me if you have any questions.     Carlton Silvestre MD  Sleep Medicine  Medical Director, [facility]  8/18/2021 ,

## 2021-08-26 ENCOUNTER — HOSPITAL ENCOUNTER (OUTPATIENT)
Dept: SLEEP MEDICINE | Facility: HOSPITAL | Age: 56
Discharge: HOME | End: 2021-08-26
Admitting: INTERNAL MEDICINE

## 2021-08-26 DIAGNOSIS — R51.9 MORNING HEADACHE: ICD-10-CM

## 2021-08-26 DIAGNOSIS — R06.83 SNORING: ICD-10-CM

## 2021-08-26 DIAGNOSIS — G47.30 OBSERVED SLEEP APNEA: ICD-10-CM

## 2021-08-26 DIAGNOSIS — G47.10 HYPERSOMNIA: ICD-10-CM

## 2021-08-26 DIAGNOSIS — G47.8 NON-RESTORATIVE SLEEP: ICD-10-CM

## 2021-08-26 DIAGNOSIS — E66.9 CLASS 2 OBESITY: ICD-10-CM

## 2021-08-26 PROCEDURE — 95806 SLEEP STUDY UNATT&RESP EFFT: CPT

## 2021-08-26 PROCEDURE — 95806 SLEEP STUDY UNATT&RESP EFFT: CPT | Performed by: INTERNAL MEDICINE

## 2021-08-30 DIAGNOSIS — G47.33 OSA (OBSTRUCTIVE SLEEP APNEA): Primary | ICD-10-CM

## 2021-08-31 ENCOUNTER — TELEPHONE (OUTPATIENT)
Dept: SLEEP MEDICINE | Facility: HOSPITAL | Age: 56
End: 2021-08-31

## 2021-09-07 ENCOUNTER — TELEPHONE (OUTPATIENT)
Dept: SLEEP MEDICINE | Facility: HOSPITAL | Age: 56
End: 2021-09-07

## 2021-09-16 ENCOUNTER — LAB (OUTPATIENT)
Dept: LAB | Facility: HOSPITAL | Age: 56
End: 2021-09-16

## 2021-09-16 ENCOUNTER — TRANSCRIBE ORDERS (OUTPATIENT)
Dept: LAB | Facility: HOSPITAL | Age: 56
End: 2021-09-16

## 2021-09-16 DIAGNOSIS — N18.2 CHRONIC RENAL DISEASE, STAGE II: ICD-10-CM

## 2021-09-16 DIAGNOSIS — N18.2 CHRONIC RENAL DISEASE, STAGE II: Primary | ICD-10-CM

## 2021-09-16 LAB
ALBUMIN SERPL-MCNC: 4.1 G/DL (ref 3.5–5.2)
ANION GAP SERPL CALCULATED.3IONS-SCNC: 10.3 MMOL/L (ref 5–15)
BACTERIA UR QL AUTO: ABNORMAL /HPF
BASOPHILS # BLD AUTO: 0.04 10*3/MM3 (ref 0–0.2)
BASOPHILS NFR BLD AUTO: 0.6 % (ref 0–1.5)
BILIRUB UR QL STRIP: NEGATIVE
BUN SERPL-MCNC: 18 MG/DL (ref 6–20)
BUN/CREAT SERPL: 17.3 (ref 7–25)
CALCIUM SPEC-SCNC: 10.3 MG/DL (ref 8.6–10.5)
CHLORIDE SERPL-SCNC: 101 MMOL/L (ref 98–107)
CLARITY UR: ABNORMAL
CO2 SERPL-SCNC: 29.7 MMOL/L (ref 22–29)
COLOR UR: YELLOW
CREAT SERPL-MCNC: 1.04 MG/DL (ref 0.57–1)
CREAT UR-MCNC: 155 MG/DL
DEPRECATED RDW RBC AUTO: 42.2 FL (ref 37–54)
EOSINOPHIL # BLD AUTO: 0.13 10*3/MM3 (ref 0–0.4)
EOSINOPHIL NFR BLD AUTO: 1.8 % (ref 0.3–6.2)
ERYTHROCYTE [DISTWIDTH] IN BLOOD BY AUTOMATED COUNT: 12.8 % (ref 12.3–15.4)
GFR SERPL CREATININE-BSD FRML MDRD: 66 ML/MIN/1.73
GLUCOSE SERPL-MCNC: 135 MG/DL (ref 65–99)
GLUCOSE UR STRIP-MCNC: NEGATIVE MG/DL
HCT VFR BLD AUTO: 40.1 % (ref 34–46.6)
HGB BLD-MCNC: 12.9 G/DL (ref 12–15.9)
HGB UR QL STRIP.AUTO: NEGATIVE
IMM GRANULOCYTES # BLD AUTO: 0.03 10*3/MM3 (ref 0–0.05)
IMM GRANULOCYTES NFR BLD AUTO: 0.4 % (ref 0–0.5)
KETONES UR QL STRIP: NEGATIVE
LEUKOCYTE ESTERASE UR QL STRIP.AUTO: NEGATIVE
LYMPHOCYTES # BLD AUTO: 1.65 10*3/MM3 (ref 0.7–3.1)
LYMPHOCYTES NFR BLD AUTO: 23.2 % (ref 19.6–45.3)
MCH RBC QN AUTO: 28.9 PG (ref 26.6–33)
MCHC RBC AUTO-ENTMCNC: 32.2 G/DL (ref 31.5–35.7)
MCV RBC AUTO: 89.9 FL (ref 79–97)
MONOCYTES # BLD AUTO: 0.32 10*3/MM3 (ref 0.1–0.9)
MONOCYTES NFR BLD AUTO: 4.5 % (ref 5–12)
NEUTROPHILS NFR BLD AUTO: 4.93 10*3/MM3 (ref 1.7–7)
NEUTROPHILS NFR BLD AUTO: 69.5 % (ref 42.7–76)
NITRITE UR QL STRIP: POSITIVE
PH UR STRIP.AUTO: 6 [PH] (ref 5–8)
PHOSPHATE SERPL-MCNC: 3.3 MG/DL (ref 2.5–4.5)
PLATELET # BLD AUTO: 256 10*3/MM3 (ref 140–450)
PMV BLD AUTO: 10 FL (ref 6–12)
POTASSIUM SERPL-SCNC: 3.5 MMOL/L (ref 3.5–5.2)
PROT UR QL STRIP: NEGATIVE
PROT UR-MCNC: 8.2 MG/DL
PROT/CREAT UR: 0.1 MG/G{CREAT}
RBC # BLD AUTO: 4.46 10*6/MM3 (ref 3.77–5.28)
RBC # UR: ABNORMAL /HPF
REF LAB TEST METHOD: ABNORMAL
SODIUM SERPL-SCNC: 141 MMOL/L (ref 136–145)
SP GR UR STRIP: 1.02 (ref 1–1.03)
SQUAMOUS #/AREA URNS HPF: ABNORMAL /HPF
UROBILINOGEN UR QL STRIP: ABNORMAL
WBC # BLD AUTO: 7.1 10*3/MM3 (ref 3.4–10.8)
WBC UR QL AUTO: ABNORMAL /HPF

## 2021-09-16 PROCEDURE — 80069 RENAL FUNCTION PANEL: CPT

## 2021-09-16 PROCEDURE — 82570 ASSAY OF URINE CREATININE: CPT

## 2021-09-16 PROCEDURE — 84156 ASSAY OF PROTEIN URINE: CPT

## 2021-09-16 PROCEDURE — 85025 COMPLETE CBC W/AUTO DIFF WBC: CPT

## 2021-09-16 PROCEDURE — 36415 COLL VENOUS BLD VENIPUNCTURE: CPT

## 2021-09-16 PROCEDURE — 81001 URINALYSIS AUTO W/SCOPE: CPT

## 2021-10-14 ENCOUNTER — OFFICE VISIT (OUTPATIENT)
Dept: FAMILY MEDICINE CLINIC | Age: 56
End: 2021-10-14

## 2021-10-14 VITALS
DIASTOLIC BLOOD PRESSURE: 66 MMHG | WEIGHT: 236 LBS | TEMPERATURE: 97.7 F | OXYGEN SATURATION: 98 % | BODY MASS INDEX: 37.93 KG/M2 | HEART RATE: 74 BPM | SYSTOLIC BLOOD PRESSURE: 118 MMHG | HEIGHT: 66 IN

## 2021-10-14 DIAGNOSIS — E66.3 OVERWEIGHT: ICD-10-CM

## 2021-10-14 DIAGNOSIS — E01.0 THYROMEGALY: ICD-10-CM

## 2021-10-14 DIAGNOSIS — R06.81 APNEA: ICD-10-CM

## 2021-10-14 DIAGNOSIS — I10 ESSENTIAL HYPERTENSION: Primary | ICD-10-CM

## 2021-10-14 DIAGNOSIS — D50.8 IRON DEFICIENCY ANEMIA SECONDARY TO INADEQUATE DIETARY IRON INTAKE: ICD-10-CM

## 2021-10-14 DIAGNOSIS — E55.9 VITAMIN D DEFICIENCY: ICD-10-CM

## 2021-10-14 PROCEDURE — 99214 OFFICE O/P EST MOD 30 MIN: CPT | Performed by: FAMILY MEDICINE

## 2021-10-14 RX ORDER — VITAMIN B COMPLEX
1 CAPSULE ORAL DAILY
COMMUNITY

## 2021-10-14 RX ORDER — MULTIPLE VITAMINS W/ MINERALS TAB 9MG-400MCG
1 TAB ORAL DAILY
COMMUNITY

## 2021-10-14 RX ORDER — HYDROCHLOROTHIAZIDE 25 MG/1
25 TABLET ORAL DAILY
Qty: 90 TABLET | Refills: 1 | Status: SHIPPED | OUTPATIENT
Start: 2021-10-14 | End: 2022-03-10 | Stop reason: SDUPTHER

## 2021-10-14 NOTE — PROGRESS NOTES
Iman Wiseman presents to [facility].    Chief Complaint: Routine follow-up    Subjective       History of Present Illness:  CORRINA Santana is in today for her hypertension.  She is overall stable and well-controlled on hydrochlorothiazide.  Her blood pressures at home are good.  She tolerates meds well without any side effects       She has been dealing some chronic left knee pain i it has resolved sincegoing to physical therapy and taking etodolac as needed    She recently went to the sleep specialist and was told she has mild sleep apnea.  She is not going to do CPAP at this time but instead try to work on diet and weight loss    She has chronic iron deficiency anemia and is taking iron daily.  We will recheck her labs at this time      She also has vitamin D deficiency and takes 5000 units twice a week and is on B complex for B12 deficiency  Review of Systems:  Review of Systems   Constitutional: Negative for chills, fatigue and fever.   HENT: Negative for ear pain, sinus pressure and sore throat.    Eyes: Negative for blurred vision and double vision.   Respiratory: Negative for cough, shortness of breath and wheezing.    Cardiovascular: Negative for chest pain and palpitations.   Gastrointestinal: Negative for abdominal pain, blood in stool, constipation, diarrhea, nausea and vomiting.   Skin: Negative for rash.   Neurological: Negative for dizziness and headache.   Psychiatric/Behavioral: Negative for depressed mood.        Objective   Medical History:  Past Medical History:   • Acute kidney failure, unspecified (HCC)   • Acute pharyngitis   • Anxiety disorder   • Chronic kidney disease, stage 2 (mild)   • Cutaneous abscess of abdominal wall   • Dyspnea, unspecified   • Elevated white blood cell count, unspecified   • Essential (primary) hypertension   • Family history of diabetes mellitus   • Generalized edema   • Hypocalcemia   • Infection following a procedure, unspecified,  subsequent encounter   • Iron deficiency anemia   • Irritable bowel syndrome with diarrhea   • Mild intermittent asthma   • Other alopecia areata   • Other dorsalgia   • Other fatigue   • Other iron deficiency anemias   • Other long term (current) drug therapy   • Other ovarian cyst, left side   • Overweight   • Shortness of breath   • Solitary pulmonary nodule   • Unilateral primary osteoarthritis, left knee   • Vitamin D deficiency, unspecified     Past Surgical History:   •  SECTION   • CHOLECYSTECTOMY   • COLONOSCOPY   • ENDOSCOPY   • HYSTERECTOMY    right oopherectomy    • LAPAROSCOPY WITH LASER    abdominal abscess drainage/post op wound infection with wound vac      Family History   Problem Relation Age of Onset   • Hypertension Mother    • Ovarian cancer Mother    • Diabetes type II Mother    • Cancer Father    • Hypertension Sister    • Hypertension Brother      Social History     Tobacco Use   • Smoking status: Never Smoker   • Smokeless tobacco: Never Used   Substance Use Topics   • Alcohol use: Not Currently       Health Maintenance Due   Topic Date Due   • ANNUAL PHYSICAL  Never done   • ZOSTER VACCINE (1 of 2) Never done   • HEPATITIS C SCREENING  Never done   • PAP SMEAR  Never done   • INFLUENZA VACCINE  Never done        Immunization History   Administered Date(s) Administered   • COVID-19 (PFIZER) 2021, 2021   • Hepatitis A 2016   • Hepatitis B 2016   • Tdap 2012       No Known Allergies     Medications:  Current Outpatient Medications on File Prior to Visit   Medication Sig   • B Complex Vitamins (vitamin b complex) capsule capsule Take 1 capsule by mouth Daily.   • Cholecalciferol (Vitamin D-3) 125 MCG (5000 UT) tablet Take 1 tablet by mouth 2 (Two) Times a Week.   • FeroSul 325 (65 Fe) MG tablet TAKE ONE TABLET BY MOUTH THREE TIMES A DAY   • ferrous sulfate 325 (65 FE) MG tablet Take 325 mg by mouth Daily With Breakfast.   • multivitamin with minerals  "(MULTIVITAMIN ADULT PO) Take 1 tablet by mouth Daily.   • [DISCONTINUED] etodolac (LODINE) 500 MG tablet    • [DISCONTINUED] hydroCHLOROthiazide (HYDRODIURIL) 25 MG tablet Take 1 tablet by mouth Daily.   • [DISCONTINUED] HYDROcodone-acetaminophen (NORCO) 5-325 MG per tablet      No current facility-administered medications on file prior to visit.       Vital Signs:   /66 (BP Location: Left arm, Patient Position: Sitting, Cuff Size: Large Adult)   Pulse 74   Temp 97.7 °F (36.5 °C) (Oral)   Ht 167.6 cm (66\")   Wt 107 kg (236 lb)   SpO2 98%   BMI 38.09 kg/m²       Physical Exam:  Physical Exam  Constitutional:       General: She is not in acute distress.     Appearance: She is normal weight. She is not ill-appearing.   HENT:      Head: Normocephalic.      Right Ear: Tympanic membrane normal. There is no impacted cerumen.      Left Ear: Tympanic membrane normal. There is no impacted cerumen.      Mouth/Throat:      Mouth: Mucous membranes are moist.      Pharynx: Oropharynx is clear.   Eyes:      Extraocular Movements: Extraocular movements intact.      Pupils: Pupils are equal, round, and reactive to light.   Neck:      Thyroid: Thyromegaly present.      Vascular: No carotid bruit.   Cardiovascular:      Rate and Rhythm: Normal rate and regular rhythm.      Pulses: Normal pulses.      Heart sounds: No murmur heard.      Pulmonary:      Effort: Pulmonary effort is normal.      Breath sounds: No wheezing, rhonchi or rales.   Abdominal:      General: Bowel sounds are normal.      Palpations: Abdomen is soft.      Tenderness: There is no abdominal tenderness.   Musculoskeletal:         General: No swelling. Normal range of motion.      Cervical back: No rigidity or tenderness.   Lymphadenopathy:      Cervical: No cervical adenopathy.   Skin:     General: Skin is warm and dry.   Neurological:      Mental Status: She is alert.      Gait: Gait normal.   Psychiatric:         Mood and Affect: Mood normal.         " Behavior: Behavior normal.         Judgment: Judgment normal.         Result Review      The following data was reviewed by Tiffani Arango MD on 10/14/2021.  Lab Results   Component Value Date    WBC 7.10 09/16/2021    HGB 12.9 09/16/2021    HCT 40.1 09/16/2021    MCV 89.9 09/16/2021     09/16/2021     Lab Results   Component Value Date    GLUCOSE 135 (H) 09/16/2021    BUN 18 09/16/2021    CREATININE 1.04 (H) 09/16/2021    EGFRIFAFRI 66 09/16/2021    BCR 17.3 09/16/2021    K 3.5 09/16/2021    CO2 29.7 (H) 09/16/2021    CALCIUM 10.3 09/16/2021    ALBUMIN 4.10 09/16/2021    LABIL2 1.1 (L) 12/01/2020    AST 23 07/15/2021    ALT 33 07/15/2021     Lab Results   Component Value Date    CHOL 168 07/15/2021    CHLPL 174 12/01/2020    TRIG 95 07/15/2021    HDL 43 07/15/2021     (H) 07/15/2021     Lab Results   Component Value Date    TSH 2.070 05/23/2020     Lab Results   Component Value Date    HGBA1C 5.60 07/15/2021     No results found for: PSA                    Assessment and Plan:          Diagnoses and all orders for this visit:    1. Essential hypertension (Primary)  Comments:  Able and well-controlled with medication.  Meds refilled  Orders:  -     Iron Profile; Future  -     Comprehensive Metabolic Panel; Future  -     Lipid Panel; Future  -     hydroCHLOROthiazide (HYDRODIURIL) 25 MG tablet; Take 1 tablet by mouth Daily.  Dispense: 90 tablet; Refill: 1    2. Apnea  Comments:  Work on diet and weight loss    3. Iron deficiency anemia secondary to inadequate dietary iron intake  Comments:  Stable on iron daily.  Due for labs  Orders:  -     Iron Profile; Future  -     CBC (No Diff); Future    4. Vitamin D deficiency  Comments:  Stable on vitamin D supplementation.  We will recheck labs  Orders:  -     Vitamin D 25 hydroxy; Future    5. Overweight  Comments:  Referral to the dietitian to help her with diet and weight loss  Orders:  -     Ambulatory Referral to Nutrition Services  -     TSH+Free T4;  Future    6. Thyromegaly  Comments:  We will check a thyroid ultrasound and TSH on her  Orders:  -     US Thyroid; Future          Follow Up   No follow-ups on file.

## 2021-11-01 ENCOUNTER — APPOINTMENT (OUTPATIENT)
Dept: ULTRASOUND IMAGING | Facility: HOSPITAL | Age: 56
End: 2021-11-01

## 2021-11-24 ENCOUNTER — APPOINTMENT (OUTPATIENT)
Dept: ULTRASOUND IMAGING | Facility: HOSPITAL | Age: 56
End: 2021-11-24

## 2021-12-12 RX ORDER — FERROUS SULFATE 325(65) MG
325 TABLET ORAL
Qty: 90 TABLET | Refills: 2 | Status: SHIPPED | OUTPATIENT
Start: 2021-12-12 | End: 2022-01-20

## 2021-12-13 ENCOUNTER — HOSPITAL ENCOUNTER (OUTPATIENT)
Dept: ULTRASOUND IMAGING | Facility: HOSPITAL | Age: 56
Discharge: HOME | End: 2021-12-13
Admitting: FAMILY MEDICINE

## 2021-12-13 DIAGNOSIS — E01.0 THYROMEGALY: ICD-10-CM

## 2021-12-13 PROCEDURE — 76536 US EXAM OF HEAD AND NECK: CPT

## 2022-01-13 ENCOUNTER — NUTRITION (OUTPATIENT)
Dept: DIABETES SERVICES | Facility: HOSPITAL | Age: 57
End: 2022-01-13

## 2022-01-13 DIAGNOSIS — E66.3 OVERWEIGHT: Primary | ICD-10-CM

## 2022-01-13 PROCEDURE — 97802 MEDICAL NUTRITION INDIV IN: CPT | Performed by: DIETITIAN, REGISTERED

## 2022-01-18 ENCOUNTER — LAB (OUTPATIENT)
Dept: LAB | Facility: HOSPITAL | Age: 57
End: 2022-01-18

## 2022-01-18 DIAGNOSIS — I10 ESSENTIAL HYPERTENSION: ICD-10-CM

## 2022-01-18 DIAGNOSIS — D50.8 IRON DEFICIENCY ANEMIA SECONDARY TO INADEQUATE DIETARY IRON INTAKE: ICD-10-CM

## 2022-01-18 DIAGNOSIS — E55.9 VITAMIN D DEFICIENCY: ICD-10-CM

## 2022-01-18 DIAGNOSIS — E66.3 OVERWEIGHT: ICD-10-CM

## 2022-01-18 LAB
25(OH)D3 SERPL-MCNC: 43.2 NG/ML (ref 30–100)
ALBUMIN SERPL-MCNC: 3.9 G/DL (ref 3.5–5.2)
ALBUMIN/GLOB SERPL: 1.1 G/DL
ALP SERPL-CCNC: 109 U/L (ref 39–117)
ALT SERPL W P-5'-P-CCNC: 41 U/L (ref 1–33)
ANION GAP SERPL CALCULATED.3IONS-SCNC: 9 MMOL/L (ref 5–15)
AST SERPL-CCNC: 27 U/L (ref 1–32)
BILIRUB SERPL-MCNC: 0.3 MG/DL (ref 0–1.2)
BUN SERPL-MCNC: 15 MG/DL (ref 6–20)
BUN/CREAT SERPL: 15.8 (ref 7–25)
CALCIUM SPEC-SCNC: 9.6 MG/DL (ref 8.6–10.5)
CHLORIDE SERPL-SCNC: 102 MMOL/L (ref 98–107)
CHOLEST SERPL-MCNC: 169 MG/DL (ref 0–200)
CO2 SERPL-SCNC: 30 MMOL/L (ref 22–29)
CREAT SERPL-MCNC: 0.95 MG/DL (ref 0.57–1)
DEPRECATED RDW RBC AUTO: 43.6 FL (ref 37–54)
ERYTHROCYTE [DISTWIDTH] IN BLOOD BY AUTOMATED COUNT: 13.1 % (ref 12.3–15.4)
GFR SERPL CREATININE-BSD FRML MDRD: 74 ML/MIN/1.73
GLOBULIN UR ELPH-MCNC: 3.5 GM/DL
GLUCOSE SERPL-MCNC: 116 MG/DL (ref 65–99)
HCT VFR BLD AUTO: 37.8 % (ref 34–46.6)
HDLC SERPL-MCNC: 47 MG/DL (ref 40–60)
HGB BLD-MCNC: 12.2 G/DL (ref 12–15.9)
IRON 24H UR-MRATE: 59 MCG/DL (ref 37–145)
IRON SATN MFR SERPL: 20 % (ref 20–50)
LDLC SERPL CALC-MCNC: 107 MG/DL (ref 0–100)
LDLC/HDLC SERPL: 2.27 {RATIO}
MCH RBC QN AUTO: 29 PG (ref 26.6–33)
MCHC RBC AUTO-ENTMCNC: 32.3 G/DL (ref 31.5–35.7)
MCV RBC AUTO: 89.8 FL (ref 79–97)
PLATELET # BLD AUTO: 252 10*3/MM3 (ref 140–450)
PMV BLD AUTO: 10.1 FL (ref 6–12)
POTASSIUM SERPL-SCNC: 3.6 MMOL/L (ref 3.5–5.2)
PROT SERPL-MCNC: 7.4 G/DL (ref 6–8.5)
RBC # BLD AUTO: 4.21 10*6/MM3 (ref 3.77–5.28)
SODIUM SERPL-SCNC: 141 MMOL/L (ref 136–145)
T4 FREE SERPL-MCNC: 1.06 NG/DL (ref 0.93–1.7)
TIBC SERPL-MCNC: 301 MCG/DL (ref 298–536)
TRANSFERRIN SERPL-MCNC: 202 MG/DL (ref 200–360)
TRIGL SERPL-MCNC: 77 MG/DL (ref 0–150)
TSH SERPL DL<=0.05 MIU/L-ACNC: 2.42 UIU/ML (ref 0.27–4.2)
VLDLC SERPL-MCNC: 15 MG/DL (ref 5–40)
WBC NRBC COR # BLD: 7.44 10*3/MM3 (ref 3.4–10.8)

## 2022-01-18 PROCEDURE — 84466 ASSAY OF TRANSFERRIN: CPT

## 2022-01-18 PROCEDURE — 36415 COLL VENOUS BLD VENIPUNCTURE: CPT

## 2022-01-18 PROCEDURE — 82306 VITAMIN D 25 HYDROXY: CPT

## 2022-01-18 PROCEDURE — 80050 GENERAL HEALTH PANEL: CPT

## 2022-01-18 PROCEDURE — 84439 ASSAY OF FREE THYROXINE: CPT

## 2022-01-18 PROCEDURE — 80061 LIPID PANEL: CPT

## 2022-01-18 PROCEDURE — 83540 ASSAY OF IRON: CPT

## 2022-01-20 ENCOUNTER — TELEMEDICINE (OUTPATIENT)
Dept: FAMILY MEDICINE CLINIC | Age: 57
End: 2022-01-20

## 2022-01-20 VITALS — BODY MASS INDEX: 37.51 KG/M2 | HEIGHT: 66 IN | WEIGHT: 233.4 LBS

## 2022-01-20 DIAGNOSIS — J45.20 MILD INTERMITTENT ASTHMA WITHOUT COMPLICATION: ICD-10-CM

## 2022-01-20 DIAGNOSIS — E66.9 CLASS 2 OBESITY: ICD-10-CM

## 2022-01-20 DIAGNOSIS — G47.30 OBSERVED SLEEP APNEA: ICD-10-CM

## 2022-01-20 DIAGNOSIS — N18.2 CHRONIC KIDNEY DISEASE, STAGE 2 (MILD): ICD-10-CM

## 2022-01-20 DIAGNOSIS — D50.8 IRON DEFICIENCY ANEMIA SECONDARY TO INADEQUATE DIETARY IRON INTAKE: ICD-10-CM

## 2022-01-20 DIAGNOSIS — R73.9 ELEVATED BLOOD SUGAR: ICD-10-CM

## 2022-01-20 DIAGNOSIS — I10 ESSENTIAL (PRIMARY) HYPERTENSION: Primary | ICD-10-CM

## 2022-01-20 DIAGNOSIS — E53.9 VITAMIN B DEFICIENCY: ICD-10-CM

## 2022-01-20 DIAGNOSIS — E55.9 VITAMIN D DEFICIENCY, UNSPECIFIED: ICD-10-CM

## 2022-01-20 PROCEDURE — 99214 OFFICE O/P EST MOD 30 MIN: CPT | Performed by: FAMILY MEDICINE

## 2022-01-20 NOTE — PROGRESS NOTES
Iman Wiseman presents to [facility].    Chief Complaint: BP follow up    Subjective       History of Present Illness:  HPI     She is on B complex for B12 deficiency, anemia profile was normal.     Esther presents with essential (primary) hypertension.  Current nonpharmacologic treatment includes low sodium diet and exercise.  Her current cardiac medication regimen includes a diuretic ( HCTZ ).  Compliance with treatment has been good; she takes her medication as directed, maintains her diet and exercise regimen, and follows up as directed.  Outside blood pressure checks have been normal and < 140/90.          she saw the nephrologist, labs are normal today, no changes needed at this time, she is to avoid NSAIDS and PPIs    She met with the sleep specialist and she has sleep apnea and she needs a cpap machine    She is overweight, she saw the dietician last week and is working on diet and exercise.     Negative thyroid sonogram on Dec 15th.     Vitamin D supplementation 5000 units 2 daily    She had covid on Oct 23, due for [employer].    Review of Systems:  Review of Systems   Constitutional: Negative for chills, fatigue and fever.   HENT: Negative for ear pain, sinus pressure and sore throat.    Eyes: Negative for blurred vision and double vision.   Respiratory: Negative for cough, shortness of breath and wheezing.    Cardiovascular: Negative for chest pain and palpitations.   Gastrointestinal: Negative for abdominal pain, blood in stool, constipation, diarrhea, nausea and vomiting.   Musculoskeletal: Negative for arthralgias.   Skin: Negative for rash.   Neurological: Negative for dizziness and headache.   Psychiatric/Behavioral: Negative for sleep disturbance, suicidal ideas, depressed mood and stress.        Objective   Medical History:  Past Medical History:   • Acute kidney failure, unspecified (HCC)   • Acute pharyngitis   • Anxiety disorder   • Chronic kidney disease, stage 2  (mild)   • Cutaneous abscess of abdominal wall   • Dyspnea, unspecified   • Elevated white blood cell count, unspecified   • Essential (primary) hypertension   • Family history of diabetes mellitus   • Generalized edema   • Hypocalcemia   • Infection following a procedure, unspecified, subsequent encounter   • Iron deficiency anemia   • Irritable bowel syndrome with diarrhea   • Mild intermittent asthma   • Other alopecia areata   • Other dorsalgia   • Other fatigue   • Other iron deficiency anemias   • Other long term (current) drug therapy   • Other ovarian cyst, left side   • Overweight   • Shortness of breath   • Solitary pulmonary nodule   • Unilateral primary osteoarthritis, left knee   • Vitamin D deficiency, unspecified     Past Surgical History:   •  SECTION   • CHOLECYSTECTOMY   • COLONOSCOPY   • ENDOSCOPY   • HYSTERECTOMY    right oopherectomy    • LAPAROSCOPY WITH LASER    abdominal abscess drainage/post op wound infection with wound vac      Family History   Problem Relation Age of Onset   • Hypertension Mother    • Ovarian cancer Mother    • Diabetes type II Mother    • Cancer Father    • Hypertension Sister    • Hypertension Brother      Social History     Tobacco Use   • Smoking status: Never Smoker   • Smokeless tobacco: Never Used   Substance Use Topics   • Alcohol use: Not Currently       Health Maintenance Due   Topic Date Due   • ANNUAL PHYSICAL  Never done   • ZOSTER VACCINE (1 of 2) Never done   • HEPATITIS C SCREENING  Never done   • PAP SMEAR  Never done   • INFLUENZA VACCINE  Never done   • COVID-19 Vaccine (3 - Booster for Pfizer series) 2021        Immunization History   Administered Date(s) Administered   • COVID-19 (PFIZER) PURPLE CAP 2021, 2021   • Hepatitis A 2016   • Hepatitis B 2016   • Tdap 2012       No Known Allergies     Medications:  Current Outpatient Medications on File Prior to Visit   Medication Sig   • B Complex Vitamins (vitamin  b complex) capsule capsule Take 1 capsule by mouth Daily.   • Cholecalciferol (Vitamin D-3) 125 MCG (5000 UT) tablet Take 1 tablet by mouth 2 (Two) Times a Week.   • FeroSul 325 (65 Fe) MG tablet TAKE ONE TABLET BY MOUTH THREE TIMES A DAY   • hydroCHLOROthiazide (HYDRODIURIL) 25 MG tablet Take 1 tablet by mouth Daily.   • multivitamin with minerals (MULTIVITAMIN ADULT PO) Take 1 tablet by mouth Daily.   • [DISCONTINUED] ferrous sulfate 325 (65 FE) MG tablet Take 1 tablet by mouth 3 (Three) Times a Day With Meals for 90 days.     No current facility-administered medications on file prior to visit.       Vital Signs:   There were no vitals taken for this visit.      Physical Exam:  Physical Exam  Vitals and nursing note reviewed.   Constitutional:       General: She is not in acute distress.     Appearance: Normal appearance. She is not ill-appearing, toxic-appearing or diaphoretic.   HENT:      Head: Normocephalic and atraumatic.      Nose: No congestion or rhinorrhea.      Mouth/Throat:      Pharynx: No oropharyngeal exudate or posterior oropharyngeal erythema.   Eyes:      Extraocular Movements: Extraocular movements intact.      Conjunctiva/sclera: Conjunctivae normal.   Pulmonary:      Breath sounds: No wheezing, rhonchi or rales.   Musculoskeletal:      Cervical back: Neck supple. No rigidity.   Lymphadenopathy:      Cervical: No cervical adenopathy.   Skin:     General: Skin is warm and dry.   Neurological:      Mental Status: She is alert and oriented to person, place, and time.   Psychiatric:         Mood and Affect: Mood normal.         Behavior: Behavior normal.         Result Review      The following data was reviewed by Tiffani Arango MD on 01/20/2022.  Lab Results   Component Value Date    WBC 7.44 01/18/2022    HGB 12.2 01/18/2022    HCT 37.8 01/18/2022    MCV 89.8 01/18/2022     01/18/2022     Lab Results   Component Value Date    GLUCOSE 116 (H) 01/18/2022    BUN 15 01/18/2022     CREATININE 0.95 01/18/2022    EGFRIFAFRI 74 01/18/2022    BCR 15.8 01/18/2022    K 3.6 01/18/2022    CO2 30.0 (H) 01/18/2022    CALCIUM 9.6 01/18/2022    ALBUMIN 3.90 01/18/2022    LABIL2 1.1 (L) 12/01/2020    AST 27 01/18/2022    ALT 41 (H) 01/18/2022     Lab Results   Component Value Date    CHOL 169 01/18/2022    CHLPL 174 12/01/2020    TRIG 77 01/18/2022    HDL 47 01/18/2022     (H) 01/18/2022     Lab Results   Component Value Date    TSH 2.420 01/18/2022     Lab Results   Component Value Date    HGBA1C 5.60 07/15/2021     Vitamin D 42 and normal                    Assessment and Plan:          Diagnoses and all orders for this visit:    1. Essential (primary) hypertension (Primary)  Comments:  Blood pressures are stable at home.  I encouraged her to continue home blood pressure checks to closely monitor blood pressures.  Continue HCTZ    2. Class 2 obesity  Comments:  Continue to see dietitian.  She needs weight loss to avoid diabetes and to help improve her sleep apnea.  Patient counseled    3. Observed sleep apnea  Comments:  She is to follow-up with her pulmonologist and get her sleep apnea CPAP  patient counseled on importance of this    4. Vitamin D deficiency, unspecified  Comments:  Labs reviewed and she is stable on vitamin D 2000 units daily    5. Mild intermittent asthma without complication  Comments:  Stable and she is doing well.  No acute symptoms today    6. Iron deficiency anemia secondary to inadequate dietary iron intake  Comments:  Currently off iron and only on B complex and her anemia profile was reviewed and normal today    7. Chronic kidney disease, stage 2 (mild)  Comments:  She is to avoid NSAIDs and PPIs.  Her kidney function was normal on review of labs today    8. Vitamin B deficiency  Comments:  Continue vitamin D supplementation    9. Elevated blood sugar  Comments:  Encourage diet and exercise to promote weight loss to prevent diabetes.  Her last hemoglobin  A1c was 5.8% and she is borderline diabetic          Follow Up   Return in about 6 months (around 7/20/2022) for Annual physical.

## 2022-01-20 NOTE — PROGRESS NOTES
"Iman Wiseman is a 56 y.o. female who presents to [facility] for nutrition consult r/t diagnosis of overweight.  Iman Wiseman is referred by Tiffani Arango MD.     Past Medical History:   Diagnosis Date   • Acute kidney failure, unspecified (HCC)    • Acute pharyngitis    • Anxiety disorder    • Chronic kidney disease, stage 2 (mild)    • Cutaneous abscess of abdominal wall    • Dyspnea, unspecified    • Elevated white blood cell count, unspecified    • Essential (primary) hypertension    • Family history of diabetes mellitus    • Generalized edema    • Hypocalcemia    • Infection following a procedure, unspecified, subsequent encounter    • Iron deficiency anemia    • Irritable bowel syndrome with diarrhea    • Mild intermittent asthma    • Other alopecia areata    • Other dorsalgia    • Other fatigue    • Other iron deficiency anemias    • Other long term (current) drug therapy    • Other ovarian cyst, left side    • Overweight    • Shortness of breath    • Solitary pulmonary nodule    • Unilateral primary osteoarthritis, left knee    • Vitamin D deficiency, unspecified        Anthropometrics  Ht Readings from Last 1 Encounters:   01/13/22 167.6 cm (66\")     Wt Readings from Last 1 Encounters:   01/13/22 106 kg (233 lb 6.4 oz)     Body mass index is 37.67 kg/m².    Nutrition Information  Nutrition Information  Enter everything you can remember eating in the last 24 hours (1 day): breakfast- sausage biscuit; lunch- salad; dinner- filet, salad/green vegetable; snacks- pt has reduced frequency of snacking; beverages- sweet tea, [place], minimal water  How many meals do you eat each day?: 3  What is the biggest challenge you have with your diet?: Knowledge    Medications  Current Outpatient Medications   Medication Sig Dispense Refill   • B Complex Vitamins (vitamin b complex) capsule capsule Take 1 capsule by mouth Daily.     • Cholecalciferol (Vitamin D-3) 125 MCG " (5000 UT) tablet Take 1 tablet by mouth 2 (Two) Times a Week. 50 tablet 1   • FeroSul 325 (65 Fe) MG tablet TAKE ONE TABLET BY MOUTH THREE TIMES A DAY 90 tablet 0   • hydroCHLOROthiazide (HYDRODIURIL) 25 MG tablet Take 1 tablet by mouth Daily. 90 tablet 1   • multivitamin with minerals (MULTIVITAMIN ADULT PO) Take 1 tablet by mouth Daily.       No current facility-administered medications for this visit.       Labs   Lab Results   Component Value Date    HGBA1C 5.60 07/15/2021       Nutrition counseling provided on calorie counting, portion control, measuring and reading labels. Discussed eating out and gave suggestions on controlling calorie intake and making healthier food choices.     Discussed 9061-5508 calories/day.  Discussed appropriate food choices and meal planning, as well as tracking daily food and beverage intake to monitor calories consumed and to assist w/ lifestyle changes.    Daily exercise encouraged (as recommended by healthcare provider). Discussed the benefits of exercise in lowering blood glucose, blood pressure, cholesterol, stress and controlling body weight.     Dietitian contact number provided.  Patient encouraged to call with questions or concerns.     Time spent with patient: 45 minutes    Copy of diabetes MNT visit note sent to Tiffani Arango MD.    Sirena Becerra RDN, LD  01/13/2022

## 2022-01-27 DIAGNOSIS — Z12.31 SCREENING MAMMOGRAM FOR BREAST CANCER: Primary | ICD-10-CM

## 2022-03-03 ENCOUNTER — TELEPHONE (OUTPATIENT)
Dept: FAMILY MEDICINE CLINIC | Age: 57
End: 2022-03-03

## 2022-03-03 NOTE — TELEPHONE ENCOUNTER
Caller: Iman Wiseman    Relationship: Self    Best call back number: [phone]    What orders are you requesting (i.e. lab or imaging): MAMMOGRAM    In what timeframe would the patient need to come in: ASAP    Where will you receive your lab/imaging services: [facility]    Additional notes: PATIENT HAD HER COVID BOOSTER SHOT ON FEB 17,2022 AND WANTED TO KNOW IF THAT WOULD AFFECT THE MAMMOGRAM.

## 2022-03-10 ENCOUNTER — TRANSCRIBE ORDERS (OUTPATIENT)
Dept: FAMILY MEDICINE CLINIC | Age: 57
End: 2022-03-10

## 2022-03-10 ENCOUNTER — OFFICE VISIT (OUTPATIENT)
Dept: FAMILY MEDICINE CLINIC | Age: 57
End: 2022-03-10

## 2022-03-10 VITALS
WEIGHT: 232 LBS | BODY MASS INDEX: 37.28 KG/M2 | OXYGEN SATURATION: 94 % | HEIGHT: 66 IN | TEMPERATURE: 98.5 F | SYSTOLIC BLOOD PRESSURE: 114 MMHG | DIASTOLIC BLOOD PRESSURE: 78 MMHG | HEART RATE: 85 BPM

## 2022-03-10 DIAGNOSIS — Z12.31 SCREENING MAMMOGRAM, ENCOUNTER FOR: Primary | ICD-10-CM

## 2022-03-10 DIAGNOSIS — Z11.59 ENCOUNTER FOR SCREENING FOR OTHER VIRAL DISEASES: ICD-10-CM

## 2022-03-10 DIAGNOSIS — Z23 ENCOUNTER FOR IMMUNIZATION: ICD-10-CM

## 2022-03-10 DIAGNOSIS — R91.1 LUNG NODULE: ICD-10-CM

## 2022-03-10 DIAGNOSIS — Z12.11 COLON CANCER SCREENING: ICD-10-CM

## 2022-03-10 DIAGNOSIS — Z12.31 ENCOUNTER FOR SCREENING MAMMOGRAM FOR BREAST CANCER: ICD-10-CM

## 2022-03-10 DIAGNOSIS — E66.9 CLASS 2 OBESITY: ICD-10-CM

## 2022-03-10 DIAGNOSIS — D50.8 IRON DEFICIENCY ANEMIA SECONDARY TO INADEQUATE DIETARY IRON INTAKE: ICD-10-CM

## 2022-03-10 DIAGNOSIS — Z01.419 WELL WOMAN EXAM: Primary | ICD-10-CM

## 2022-03-10 DIAGNOSIS — I10 ESSENTIAL HYPERTENSION: ICD-10-CM

## 2022-03-10 DIAGNOSIS — Z78.0 POSTMENOPAUSAL STATE: ICD-10-CM

## 2022-03-10 DIAGNOSIS — N18.2 CHRONIC KIDNEY DISEASE, STAGE 2 (MILD): ICD-10-CM

## 2022-03-10 DIAGNOSIS — I10 ESSENTIAL (PRIMARY) HYPERTENSION: ICD-10-CM

## 2022-03-10 DIAGNOSIS — E55.9 VITAMIN D DEFICIENCY, UNSPECIFIED: ICD-10-CM

## 2022-03-10 DIAGNOSIS — N39.0 URINARY TRACT INFECTION WITHOUT HEMATURIA, SITE UNSPECIFIED: ICD-10-CM

## 2022-03-10 DIAGNOSIS — H61.23 BILATERAL IMPACTED CERUMEN: ICD-10-CM

## 2022-03-10 LAB
BILIRUB BLD-MCNC: NEGATIVE MG/DL
CLARITY, POC: CLEAR
COLOR UR: YELLOW
DEVELOPER EXPIRATION DATE: NORMAL
DEVELOPER LOT NUMBER: NORMAL
EXPIRATION DATE: ABNORMAL
EXPIRATION DATE: NORMAL
FECAL OCCULT BLOOD SCREEN, POC: NEGATIVE
GLUCOSE UR STRIP-MCNC: NEGATIVE MG/DL
KETONES UR QL: NEGATIVE
LEUKOCYTE EST, POC: ABNORMAL
Lab: ABNORMAL
Lab: NORMAL
NEGATIVE CONTROL: NEGATIVE
NITRITE UR-MCNC: POSITIVE MG/ML
PH UR: 5.5 [PH] (ref 5–8)
POSITIVE CONTROL: POSITIVE
PROT UR STRIP-MCNC: NEGATIVE MG/DL
RBC # UR STRIP: NEGATIVE /UL
SP GR UR: 1.02 (ref 1–1.03)
UROBILINOGEN UR QL: NORMAL

## 2022-03-10 PROCEDURE — 99214 OFFICE O/P EST MOD 30 MIN: CPT | Performed by: FAMILY MEDICINE

## 2022-03-10 PROCEDURE — 99396 PREV VISIT EST AGE 40-64: CPT | Performed by: FAMILY MEDICINE

## 2022-03-10 PROCEDURE — 90715 TDAP VACCINE 7 YRS/> IM: CPT | Performed by: FAMILY MEDICINE

## 2022-03-10 PROCEDURE — 82270 OCCULT BLOOD FECES: CPT | Performed by: FAMILY MEDICINE

## 2022-03-10 PROCEDURE — 87186 SC STD MICRODIL/AGAR DIL: CPT | Performed by: FAMILY MEDICINE

## 2022-03-10 PROCEDURE — 87077 CULTURE AEROBIC IDENTIFY: CPT | Performed by: FAMILY MEDICINE

## 2022-03-10 PROCEDURE — 81003 URINALYSIS AUTO W/O SCOPE: CPT | Performed by: FAMILY MEDICINE

## 2022-03-10 PROCEDURE — 87086 URINE CULTURE/COLONY COUNT: CPT | Performed by: FAMILY MEDICINE

## 2022-03-10 PROCEDURE — 90471 IMMUNIZATION ADMIN: CPT | Performed by: FAMILY MEDICINE

## 2022-03-10 RX ORDER — HYDROCHLOROTHIAZIDE 25 MG/1
25 TABLET ORAL DAILY
Qty: 90 TABLET | Refills: 1 | Status: SHIPPED | OUTPATIENT
Start: 2022-03-10 | End: 2022-12-14

## 2022-03-10 NOTE — PROGRESS NOTES
Iman Wiseman presents to [facility].    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  She has no sexual issues.  She is not currently sexually active.  Her breast exams are WNL.  H/o abd cyst removal in 2019.  No urinary incontinence.  She always wears a seatbelt in car.  Last mammogram  3/25/21-normal, last dexa ?, last colonoscopy 7/9/2019-2 3 mm polyps, repeat in 2024.  Immunizations:   Covid UTD, Tdap 2012.  Recommend yearly flu vaccination, shingrix, tdap.     Esther is in today for her hypertension.  She is overall stable and well-controlled on hydrochlorothiazide.  Her blood pressures at home are good.  She tolerates meds well without any side effects        She has been dealing some chronic left knee pain i it has resolved sincegoing to physical therapy and taking etodolac as needed     She recently went to the sleep specialist and was told she has mild sleep apnea.  She is not going to do CPAP at this time but instead try to work on diet and weight loss     She has chronic iron deficiency anemia and is taking iron daily.  We will recheck her labs at this time       She also has vitamin D deficiency and takes 5000 units twice a week and is on B complex for B12 deficiency      In 2019 she had a CT of chest that showed 12 mm pleural-based noncalcified nodule, superior segment, left lower lobe, as above.   She needs this re evaluated    Review of Systems:  Review of Systems   Constitutional: Negative for chills and fever.   HENT: Negative for ear pain, sinus pressure and sore throat.    Eyes: Negative for blurred vision and double vision.   Respiratory: Negative for cough, shortness of breath and wheezing.    Cardiovascular: Negative for chest pain and palpitations.   Gastrointestinal: Negative for abdominal pain, blood in stool, constipation, diarrhea,  nausea and vomiting.   Skin: Negative for rash.   Neurological: Negative for dizziness and headache.   Psychiatric/Behavioral: Negative for sleep disturbance, suicidal ideas and depressed mood. The patient is not nervous/anxious.         Objective   Medical History:  Past Medical History:   • Acute kidney failure, unspecified (HCC)   • Acute pharyngitis   • Anxiety disorder   • Chronic kidney disease, stage 2 (mild)   • Cutaneous abscess of abdominal wall   • Dyspnea, unspecified   • Elevated white blood cell count, unspecified   • Essential (primary) hypertension   • Family history of diabetes mellitus   • Generalized edema   • Hypocalcemia   • Infection following a procedure, unspecified, subsequent encounter   • Iron deficiency anemia   • Irritable bowel syndrome with diarrhea   • Mild intermittent asthma   • Other alopecia areata   • Other dorsalgia   • Other fatigue   • Other iron deficiency anemias   • Other long term (current) drug therapy   • Other ovarian cyst, left side   • Overweight   • Shortness of breath   • Solitary pulmonary nodule   • Unilateral primary osteoarthritis, left knee   • Vitamin D deficiency, unspecified     Past Surgical History:   •  SECTION   • CHOLECYSTECTOMY   • COLONOSCOPY   • ENDOSCOPY   • HYSTERECTOMY    right oopherectomy    • LAPAROSCOPY WITH LASER    abdominal abscess drainage/post op wound infection with wound vac      Family History   Problem Relation Age of Onset   • Hypertension Mother    • Ovarian cancer Mother    • Diabetes type II Mother    • Cancer Father    • Hypertension Sister    • Hypertension Brother      Social History     Tobacco Use   • Smoking status: Never Smoker   • Smokeless tobacco: Never Used   Substance Use Topics   • Alcohol use: Not Currently       Health Maintenance Due   Topic Date Due   • HEPATITIS C SCREENING  Never done        Immunization History   Administered Date(s) Administered   • COVID-19 (PFIZER) PURPLE CAP 2021, 2021  "  • Covid-19 (Pfizer) Gray Cap 02/17/2022   • Hepatitis A 04/25/2016   • Hepatitis B 04/25/2016   • Tdap 01/30/2012, 03/10/2022       No Known Allergies     Medications:  Current Outpatient Medications on File Prior to Visit   Medication Sig   • B Complex Vitamins (vitamin b complex) capsule capsule Take 1 capsule by mouth Daily.   • Cholecalciferol (Vitamin D-3) 125 MCG (5000 UT) tablet Take 1 tablet by mouth 2 (Two) Times a Week.   • FeroSul 325 (65 Fe) MG tablet TAKE ONE TABLET BY MOUTH THREE TIMES A DAY   • multivitamin with minerals tablet tablet Take 1 tablet by mouth Daily.   • [DISCONTINUED] hydroCHLOROthiazide (HYDRODIURIL) 25 MG tablet Take 1 tablet by mouth Daily.     No current facility-administered medications on file prior to visit.       Vital Signs:   /78 (BP Location: Right arm, Patient Position: Sitting, Cuff Size: Large Adult)   Pulse 85   Temp 98.5 °F (36.9 °C) (Oral)   Ht 167.6 cm (66\")   Wt 105 kg (232 lb)   SpO2 94%   BMI 37.45 kg/m²       Physical Exam:  Physical Exam  Exam conducted with a chaperone present.   Constitutional:       General: She is not in acute distress.     Appearance: She is normal weight. She is not ill-appearing.   HENT:      Head: Normocephalic.      Right Ear: Tympanic membrane normal. There is impacted cerumen.      Left Ear: Tympanic membrane normal. There is impacted cerumen.      Mouth/Throat:      Mouth: Mucous membranes are moist.      Pharynx: Oropharynx is clear.   Eyes:      Extraocular Movements: Extraocular movements intact.      Pupils: Pupils are equal, round, and reactive to light.   Neck:      Vascular: No carotid bruit.   Cardiovascular:      Rate and Rhythm: Normal rate and regular rhythm.      Pulses: Normal pulses.      Heart sounds: No murmur heard.  Pulmonary:      Effort: Pulmonary effort is normal.      Breath sounds: No wheezing, rhonchi or rales.   Chest:   Breasts:      Devonte Score is 5.      Right: Normal. No axillary adenopathy. "      Left: Normal. No axillary adenopathy.       Abdominal:      General: Bowel sounds are normal.      Palpations: Abdomen is soft.      Tenderness: There is no abdominal tenderness.   Genitourinary:     General: Normal vulva.      Vagina: Normal.      Cervix: Normal.      Uterus: Normal.       Adnexa: Right adnexa normal and left adnexa normal.      Rectum: Normal.   Musculoskeletal:         General: No swelling. Normal range of motion.      Cervical back: No rigidity or tenderness.   Lymphadenopathy:      Cervical: No cervical adenopathy.      Upper Body:      Right upper body: No axillary adenopathy.      Left upper body: No axillary adenopathy.   Skin:     General: Skin is warm and dry.   Neurological:      Mental Status: She is alert.      Gait: Gait normal.   Psychiatric:         Mood and Affect: Mood normal.         Behavior: Behavior normal.         Judgment: Judgment normal.         Result Review      The following data was reviewed by Tiffani Arango MD on 03/10/2022.  Lab Results   Component Value Date    WBC 7.44 01/18/2022    HGB 12.2 01/18/2022    HCT 37.8 01/18/2022    MCV 89.8 01/18/2022     01/18/2022     Lab Results   Component Value Date    GLUCOSE 116 (H) 01/18/2022    BUN 15 01/18/2022    CREATININE 0.95 01/18/2022    EGFRIFAFRI 74 01/18/2022    BCR 15.8 01/18/2022    K 3.6 01/18/2022    CO2 30.0 (H) 01/18/2022    CALCIUM 9.6 01/18/2022    ALBUMIN 3.90 01/18/2022    LABIL2 1.1 (L) 12/01/2020    AST 27 01/18/2022    ALT 41 (H) 01/18/2022     Lab Results   Component Value Date    CHOL 169 01/18/2022    CHLPL 174 12/01/2020    TRIG 77 01/18/2022    HDL 47 01/18/2022     (H) 01/18/2022     Lab Results   Component Value Date    TSH 2.420 01/18/2022     Lab Results   Component Value Date    HGBA1C 5.60 07/15/2021     No results found for: PSA                    Assessment and Plan:          Diagnoses and all orders for this visit:    1. Well woman exam (Primary)  Comments:  no pap  needed, will order mammo and dexa, colonoscopy due 2024, recommend shingrix, yearly flu and tdap  Orders:  -     POCT urinalysis dipstick, automated    2. Class 2 obesity  Comments:  work on diet and exercise    3. Essential (primary) hypertension  Comments:  very well controlled    4. Chronic kidney disease, stage 2 (mild)  Comments:  stable, labs reviewed    5. Iron deficiency anemia secondary to inadequate dietary iron intake  Comments:  stable on iron supplementation    6. Vitamin D deficiency, unspecified    7. Lung nodule  -     CT Chest Without Contrast; Future    8. Encounter for immunization  -     Tdap Vaccine Greater Than or Equal To 6yo IM    9. Encounter for screening for other viral diseases  -     Hepatitis C antibody; Future    10. Essential hypertension  Comments:  Able and well-controlled with medication.  Meds refilled  Orders:  -     hydroCHLOROthiazide (HYDRODIURIL) 25 MG tablet; Take 1 tablet by mouth Daily.  Dispense: 90 tablet; Refill: 1    11. Bilateral impacted cerumen  -     Ear Cerumen Removal    12. Encounter for screening mammogram for breast cancer    13. Postmenopausal state  -     DEXA Bone Density Axial; Future    14. Urinary tract infection without hematuria, site unspecified  -     Urine Culture - Urine, Urine, Clean Catch; Future  -     Urine Culture - Urine, Urine, Clean Catch    15. Colon cancer screening  -     POC Occult Blood Stool          Follow Up   Return in about 6 months (around 9/10/2022) for Recheck.    Answers for HPI/ROS submitted by the patient on 3/8/2022  Please describe your symptoms.: yearly check up  Have you had these symptoms before?: No  How long have you been having these symptoms?: 1-4 days  Please list any medications you are currently taking for this condition.: n/a  Please describe any probable cause for these symptoms. : n/a  What is the primary reason for your visit?: Other

## 2022-03-11 ENCOUNTER — HOSPITAL ENCOUNTER (OUTPATIENT)
Dept: CT IMAGING | Facility: HOSPITAL | Age: 57
Discharge: HOME | End: 2022-03-11
Admitting: FAMILY MEDICINE

## 2022-03-11 DIAGNOSIS — R91.1 LUNG NODULE: ICD-10-CM

## 2022-03-11 PROCEDURE — 71250 CT THORAX DX C-: CPT

## 2022-03-12 LAB — BACTERIA SPEC AEROBE CULT: ABNORMAL

## 2022-03-14 DIAGNOSIS — N30.00 ACUTE CYSTITIS WITHOUT HEMATURIA: Primary | ICD-10-CM

## 2022-03-14 RX ORDER — NITROFURANTOIN 25; 75 MG/1; MG/1
100 CAPSULE ORAL 2 TIMES DAILY
Qty: 10 CAPSULE | Refills: 0 | Status: SHIPPED | OUTPATIENT
Start: 2022-03-14 | End: 2022-03-19

## 2022-03-17 ENCOUNTER — APPOINTMENT (OUTPATIENT)
Dept: BONE DENSITY | Facility: HOSPITAL | Age: 57
End: 2022-03-17

## 2022-03-17 ENCOUNTER — APPOINTMENT (OUTPATIENT)
Dept: CT IMAGING | Facility: HOSPITAL | Age: 57
End: 2022-03-17

## 2022-04-22 ENCOUNTER — HOSPITAL ENCOUNTER (OUTPATIENT)
Dept: BONE DENSITY | Facility: HOSPITAL | Age: 57
Discharge: HOME | End: 2022-04-22

## 2022-04-22 ENCOUNTER — HOSPITAL ENCOUNTER (OUTPATIENT)
Dept: MAMMOGRAPHY | Facility: HOSPITAL | Age: 57
Discharge: HOME | End: 2022-04-22

## 2022-04-22 DIAGNOSIS — Z12.31 SCREENING MAMMOGRAM FOR BREAST CANCER: ICD-10-CM

## 2022-04-22 DIAGNOSIS — Z78.0 POSTMENOPAUSAL STATE: ICD-10-CM

## 2022-04-22 PROCEDURE — 77080 DXA BONE DENSITY AXIAL: CPT

## 2022-04-22 PROCEDURE — 77067 SCR MAMMO BI INCL CAD: CPT

## 2022-04-22 PROCEDURE — 77063 BREAST TOMOSYNTHESIS BI: CPT

## 2022-04-26 ENCOUNTER — TELEPHONE (OUTPATIENT)
Dept: FAMILY MEDICINE CLINIC | Age: 57
End: 2022-04-26

## 2022-05-03 ENCOUNTER — TELEPHONE (OUTPATIENT)
Dept: FAMILY MEDICINE CLINIC | Age: 57
End: 2022-05-03

## 2022-09-21 ENCOUNTER — TRANSCRIBE ORDERS (OUTPATIENT)
Dept: ADMINISTRATIVE | Facility: HOSPITAL | Age: 57
End: 2022-09-21

## 2022-09-21 DIAGNOSIS — N18.2 CHRONIC KIDNEY DISEASE, STAGE II (MILD): Primary | ICD-10-CM

## 2022-10-19 ENCOUNTER — OFFICE VISIT (OUTPATIENT)
Dept: FAMILY MEDICINE CLINIC | Age: 57
End: 2022-10-19

## 2022-10-19 VITALS
SYSTOLIC BLOOD PRESSURE: 119 MMHG | HEIGHT: 66 IN | TEMPERATURE: 98.2 F | WEIGHT: 230.8 LBS | HEART RATE: 84 BPM | BODY MASS INDEX: 37.09 KG/M2 | DIASTOLIC BLOOD PRESSURE: 66 MMHG | OXYGEN SATURATION: 97 %

## 2022-10-19 DIAGNOSIS — N18.2 CHRONIC KIDNEY DISEASE, STAGE 2 (MILD): ICD-10-CM

## 2022-10-19 DIAGNOSIS — I10 ESSENTIAL (PRIMARY) HYPERTENSION: Primary | ICD-10-CM

## 2022-10-19 DIAGNOSIS — D50.8 IRON DEFICIENCY ANEMIA SECONDARY TO INADEQUATE DIETARY IRON INTAKE: ICD-10-CM

## 2022-10-19 DIAGNOSIS — R91.1 LUNG NODULE: ICD-10-CM

## 2022-10-19 DIAGNOSIS — E55.9 VITAMIN D DEFICIENCY, UNSPECIFIED: ICD-10-CM

## 2022-10-19 DIAGNOSIS — Z11.59 ENCOUNTER FOR SCREENING FOR OTHER VIRAL DISEASES: ICD-10-CM

## 2022-10-19 DIAGNOSIS — E53.9 VITAMIN B DEFICIENCY: ICD-10-CM

## 2022-10-19 PROCEDURE — 99214 OFFICE O/P EST MOD 30 MIN: CPT | Performed by: FAMILY MEDICINE

## 2022-10-19 RX ORDER — FERROUS SULFATE 325(65) MG
1 TABLET ORAL
Qty: 90 TABLET | Refills: 1 | Status: SHIPPED | OUTPATIENT
Start: 2022-10-19

## 2022-10-19 NOTE — PROGRESS NOTES
Iman Wiesman presents to [facility].    Chief Complaint:  bp FOLLOW UP    Subjective       History of Present Illness:  CORRINA Santana is in today for her hypertension.  She is overall stable and well-controlled on hydrochlorothiazide.  Her blood pressures at home are good.  She tolerates meds well without any side effects        She has been dealing some chronic left knee pain i it has resolved sincegoing to physical therapy and taking etodolac as needed     She recently went to the sleep specialist and was told she has mild sleep apnea.  She is not going to do CPAP at this time but instead try to work on diet and weight loss     She has chronic iron deficiency anemia and is taking iron daily.  We will recheck her labs at this time       She also has vitamin D deficiency and takes 5000 units twice a week and is on B complex for B12 deficiency        In 2019 she had a CT of chest that showed 12 mm pleural-based noncalcified nodule, superior segment, left lower lobe, as above.  repeat CT chest was improved IMPRESSION 3/2022:  1. The previously described subpleural nodule in the posterior apically segment of the left lower lobe has nearly resolved with a focal area of residual subpleural scarring and a 6 mm amorphous ground-glass nodule remaining, likely scarring related to an infectious/inflammatory nodule.2. Additional benign-appearing nodules in both lungs.  No acute intrathoracic abnormality.  NO F/U INDICATED!         Review of Systems:  Review of Systems   Constitutional: Negative for chills, fatigue and fever.   HENT: Negative for ear pain, sinus pressure and sore throat.    Eyes: Negative for blurred vision and double vision.   Respiratory: Negative for cough, shortness of breath and wheezing.    Cardiovascular: Negative for chest pain and palpitations.   Gastrointestinal: Negative for abdominal pain, blood in stool, constipation, diarrhea, nausea and vomiting.   Skin: Negative  for rash.   Neurological: Negative for dizziness and headache.   Psychiatric/Behavioral: Negative for depressed mood.        Objective   Medical History:  Past Medical History:   • Acute kidney failure, unspecified (HCC)   • Acute pharyngitis   • Anxiety disorder   • Chronic kidney disease, stage 2 (mild)   • Cutaneous abscess of abdominal wall   • Dyspnea, unspecified   • Elevated white blood cell count, unspecified   • Essential (primary) hypertension   • Family history of diabetes mellitus   • Generalized edema   • Hypocalcemia   • Infection following a procedure, unspecified, subsequent encounter   • Iron deficiency anemia   • Irritable bowel syndrome with diarrhea   • Mild intermittent asthma   • Other alopecia areata   • Other dorsalgia   • Other fatigue   • Other iron deficiency anemias   • Other long term (current) drug therapy   • Other ovarian cyst, left side   • Overweight   • Shortness of breath   • Solitary pulmonary nodule   • Unilateral primary osteoarthritis, left knee   • Vitamin D deficiency, unspecified     Past Surgical History:   •  SECTION   • CHOLECYSTECTOMY   • COLONOSCOPY   • ENDOSCOPY   • HYSTERECTOMY    right oopherectomy    • LAPAROSCOPY WITH LASER    abdominal abscess drainage/post op wound infection with wound vac      Family History   Problem Relation Age of Onset   • Hypertension Mother    • Ovarian cancer Mother    • Diabetes type II Mother    • Cancer Father    • Hypertension Sister    • Hypertension Brother      Social History     Tobacco Use   • Smoking status: Never   • Smokeless tobacco: Never   Substance Use Topics   • Alcohol use: Not Currently       Health Maintenance Due   Topic Date Due   • HEPATITIS C SCREENING  Never done        Immunization History   Administered Date(s) Administered   • COVID-19 (PFIZER) PURPLE CAP 2021, 2021   • Covid-19 (Pfizer) Gray Cap 2022   • Hepatitis A 2016   • Hepatitis B 2016   • Tdap 2012,  "03/10/2022       No Known Allergies     Medications:  Current Outpatient Medications on File Prior to Visit   Medication Sig   • B Complex Vitamins (vitamin b complex) capsule capsule Take 1 capsule by mouth Daily.   • Cholecalciferol (Vitamin D-3) 125 MCG (5000 UT) tablet Take 1 tablet by mouth 2 (Two) Times a Week.   • hydroCHLOROthiazide (HYDRODIURIL) 25 MG tablet Take 1 tablet by mouth Daily.   • multivitamin with minerals tablet tablet Take 1 tablet by mouth Daily.   • [DISCONTINUED] FeroSul 325 (65 Fe) MG tablet TAKE ONE TABLET BY MOUTH THREE TIMES A DAY     No current facility-administered medications on file prior to visit.       Vital Signs:   /66 (BP Location: Right arm, Patient Position: Sitting, Cuff Size: Large Adult)   Pulse 84   Temp 98.2 °F (36.8 °C) (Oral)   Ht 167.6 cm (66\")   Wt 105 kg (230 lb 12.8 oz)   SpO2 97% Comment: room air  BMI 37.25 kg/m²       Physical Exam:  Physical Exam  Vitals and nursing note reviewed.   Constitutional:       General: She is not in acute distress.     Appearance: Normal appearance. She is obese. She is not ill-appearing, toxic-appearing or diaphoretic.   HENT:      Head: Normocephalic and atraumatic.      Right Ear: Tympanic membrane, ear canal and external ear normal.      Left Ear: Tympanic membrane, ear canal and external ear normal.      Nose: No congestion or rhinorrhea.      Mouth/Throat:      Mouth: Mucous membranes are moist.      Pharynx: Oropharynx is clear. No oropharyngeal exudate or posterior oropharyngeal erythema.   Eyes:      Extraocular Movements: Extraocular movements intact.      Conjunctiva/sclera: Conjunctivae normal.      Pupils: Pupils are equal, round, and reactive to light.   Cardiovascular:      Rate and Rhythm: Normal rate and regular rhythm.      Heart sounds: Normal heart sounds.   Pulmonary:      Effort: Pulmonary effort is normal.      Breath sounds: Normal breath sounds. No wheezing, rhonchi or rales.   Abdominal:      " General: Abdomen is flat.      Palpations: Abdomen is soft.   Musculoskeletal:      Cervical back: Neck supple. No rigidity.   Lymphadenopathy:      Cervical: No cervical adenopathy.   Skin:     General: Skin is warm and dry.   Neurological:      Mental Status: She is alert and oriented to person, place, and time.   Psychiatric:         Mood and Affect: Mood normal.         Behavior: Behavior normal.         Result Review      The following data was reviewed by Tiffani Arango MD on 10/19/2022.  Lab Results   Component Value Date    WBC 7.44 01/18/2022    HGB 12.2 01/18/2022    HCT 37.8 01/18/2022    MCV 89.8 01/18/2022     01/18/2022     Lab Results   Component Value Date    GLUCOSE 116 (H) 01/18/2022    BUN 15 01/18/2022    CREATININE 0.95 01/18/2022    EGFRIFAFRI 74 01/18/2022    BCR 15.8 01/18/2022    K 3.6 01/18/2022    CO2 30.0 (H) 01/18/2022    CALCIUM 9.6 01/18/2022    ALBUMIN 3.90 01/18/2022    LABIL2 1.1 (L) 12/01/2020    AST 27 01/18/2022    ALT 41 (H) 01/18/2022     Lab Results   Component Value Date    CHOL 169 01/18/2022    CHLPL 174 12/01/2020    TRIG 77 01/18/2022    HDL 47 01/18/2022     (H) 01/18/2022     Lab Results   Component Value Date    TSH 2.420 01/18/2022     Lab Results   Component Value Date    HGBA1C 5.60 07/15/2021     No results found for: PSA                    Assessment and Plan:          Diagnoses and all orders for this visit:    1. Essential (primary) hypertension (Primary)  Comments:  stable on current meds, no changes needed in hctz or current treatment plan  Orders:  -     Comprehensive Metabolic Panel; Future  -     Lipid Panel; Future    2. Chronic kidney disease, stage 2 (mild)    3. Vitamin D deficiency, unspecified  Comments:  stable on vit D supplementation, 49457 units 2 po daily-due for labs  Orders:  -     Vitamin D 25 hydroxy; Future    4. Iron deficiency anemia secondary to inadequate dietary iron intake  Comments:  iron deficiency anemia, on iron,  will add Vitamin c and recheck labs today  Orders:  -     Iron Profile; Future  -     CBC (No Diff); Future  -     ferrous sulfate (FeroSul) 325 (65 FE) MG tablet; Take 1 tablet by mouth Daily With Breakfast.  Dispense: 90 tablet; Refill: 1    5. Lung nodule  Comments:  CT CHEST SHOWED ONE NODULE RESOLVED, OTHER WAS STABLE AND UNCHANGED, NO F/U NEEDED    6. Vitamin B deficiency  Comments:  on vitamin B and C  Orders:  -     Vitamin B12; Future    7. Encounter for screening for other viral diseases  -     Hepatitis C antibody; Future          Follow Up   Return in about 6 months (around 4/19/2023) for Annual physical.

## 2022-10-20 ENCOUNTER — LAB (OUTPATIENT)
Dept: LAB | Facility: HOSPITAL | Age: 57
End: 2022-10-20

## 2022-10-20 DIAGNOSIS — E53.9 VITAMIN B DEFICIENCY: ICD-10-CM

## 2022-10-20 DIAGNOSIS — E55.9 VITAMIN D DEFICIENCY, UNSPECIFIED: ICD-10-CM

## 2022-10-20 DIAGNOSIS — N18.2 CHRONIC KIDNEY DISEASE, STAGE II (MILD): ICD-10-CM

## 2022-10-20 DIAGNOSIS — I10 ESSENTIAL (PRIMARY) HYPERTENSION: ICD-10-CM

## 2022-10-20 DIAGNOSIS — Z11.59 ENCOUNTER FOR SCREENING FOR OTHER VIRAL DISEASES: ICD-10-CM

## 2022-10-20 DIAGNOSIS — D50.8 IRON DEFICIENCY ANEMIA SECONDARY TO INADEQUATE DIETARY IRON INTAKE: ICD-10-CM

## 2022-10-20 LAB
25(OH)D3 SERPL-MCNC: 40.4 NG/ML (ref 30–100)
ALBUMIN SERPL-MCNC: 3.9 G/DL (ref 3.5–5.2)
ALBUMIN/GLOB SERPL: 1.3 G/DL
ALP SERPL-CCNC: 116 U/L (ref 39–117)
ALT SERPL W P-5'-P-CCNC: 36 U/L (ref 1–33)
ANION GAP SERPL CALCULATED.3IONS-SCNC: 10 MMOL/L (ref 5–15)
AST SERPL-CCNC: 30 U/L (ref 1–32)
BACTERIA UR QL AUTO: ABNORMAL /HPF
BASOPHILS # BLD AUTO: 0.05 10*3/MM3 (ref 0–0.2)
BASOPHILS NFR BLD AUTO: 0.8 % (ref 0–1.5)
BILIRUB SERPL-MCNC: 0.4 MG/DL (ref 0–1.2)
BILIRUB UR QL STRIP: NEGATIVE
BUN SERPL-MCNC: 16 MG/DL (ref 6–20)
BUN/CREAT SERPL: 15.5 (ref 7–25)
CALCIUM SPEC-SCNC: 9.4 MG/DL (ref 8.6–10.5)
CHLORIDE SERPL-SCNC: 103 MMOL/L (ref 98–107)
CHOLEST SERPL-MCNC: 172 MG/DL (ref 0–200)
CLARITY UR: ABNORMAL
CO2 SERPL-SCNC: 30 MMOL/L (ref 22–29)
COLOR UR: YELLOW
CREAT SERPL-MCNC: 1.03 MG/DL (ref 0.57–1)
CREAT UR-MCNC: 233.9 MG/DL
DEPRECATED RDW RBC AUTO: 41.4 FL (ref 37–54)
EGFRCR SERPLBLD CKD-EPI 2021: 63.6 ML/MIN/1.73
EOSINOPHIL # BLD AUTO: 0.11 10*3/MM3 (ref 0–0.4)
EOSINOPHIL NFR BLD AUTO: 1.7 % (ref 0.3–6.2)
ERYTHROCYTE [DISTWIDTH] IN BLOOD BY AUTOMATED COUNT: 12.6 % (ref 12.3–15.4)
GLOBULIN UR ELPH-MCNC: 3.1 GM/DL
GLUCOSE SERPL-MCNC: 117 MG/DL (ref 65–99)
GLUCOSE UR STRIP-MCNC: NEGATIVE MG/DL
HCT VFR BLD AUTO: 38.3 % (ref 34–46.6)
HCV AB SER DONR QL: NORMAL
HDLC SERPL-MCNC: 47 MG/DL (ref 40–60)
HGB BLD-MCNC: 12 G/DL (ref 12–15.9)
HGB UR QL STRIP.AUTO: ABNORMAL
IMM GRANULOCYTES # BLD AUTO: 0.02 10*3/MM3 (ref 0–0.05)
IMM GRANULOCYTES NFR BLD AUTO: 0.3 % (ref 0–0.5)
IRON 24H UR-MRATE: 54 MCG/DL (ref 37–145)
IRON SATN MFR SERPL: 18 % (ref 20–50)
KETONES UR QL STRIP: NEGATIVE
LDLC SERPL CALC-MCNC: 110 MG/DL (ref 0–100)
LDLC/HDLC SERPL: 2.31 {RATIO}
LEUKOCYTE ESTERASE UR QL STRIP.AUTO: NEGATIVE
LYMPHOCYTES # BLD AUTO: 1.45 10*3/MM3 (ref 0.7–3.1)
LYMPHOCYTES NFR BLD AUTO: 22 % (ref 19.6–45.3)
MCH RBC QN AUTO: 27.8 PG (ref 26.6–33)
MCHC RBC AUTO-ENTMCNC: 31.3 G/DL (ref 31.5–35.7)
MCV RBC AUTO: 88.7 FL (ref 79–97)
MONOCYTES # BLD AUTO: 0.34 10*3/MM3 (ref 0.1–0.9)
MONOCYTES NFR BLD AUTO: 5.2 % (ref 5–12)
NEUTROPHILS NFR BLD AUTO: 4.62 10*3/MM3 (ref 1.7–7)
NEUTROPHILS NFR BLD AUTO: 70 % (ref 42.7–76)
NITRITE UR QL STRIP: POSITIVE
PH UR STRIP.AUTO: 6 [PH] (ref 5–8)
PHOSPHATE SERPL-MCNC: 3 MG/DL (ref 2.5–4.5)
PLATELET # BLD AUTO: 249 10*3/MM3 (ref 140–450)
PMV BLD AUTO: 10.1 FL (ref 6–12)
POTASSIUM SERPL-SCNC: 3.1 MMOL/L (ref 3.5–5.2)
PROT ?TM UR-MCNC: 12.2 MG/DL
PROT SERPL-MCNC: 7 G/DL (ref 6–8.5)
PROT UR QL STRIP: NEGATIVE
PROT/CREAT UR: 0.05 MG/G{CREAT}
RBC # BLD AUTO: 4.32 10*6/MM3 (ref 3.77–5.28)
RBC # UR STRIP: ABNORMAL /HPF
REF LAB TEST METHOD: ABNORMAL
SODIUM SERPL-SCNC: 143 MMOL/L (ref 136–145)
SP GR UR STRIP: >=1.03 (ref 1–1.03)
SQUAMOUS #/AREA URNS HPF: ABNORMAL /HPF
TIBC SERPL-MCNC: 302 MCG/DL (ref 298–536)
TRANSFERRIN SERPL-MCNC: 203 MG/DL (ref 200–360)
TRIGL SERPL-MCNC: 82 MG/DL (ref 0–150)
UROBILINOGEN UR QL STRIP: ABNORMAL
VIT B12 BLD-MCNC: 452 PG/ML (ref 211–946)
VLDLC SERPL-MCNC: 15 MG/DL (ref 5–40)
WBC # UR STRIP: ABNORMAL /HPF
WBC NRBC COR # BLD: 6.59 10*3/MM3 (ref 3.4–10.8)

## 2022-10-20 PROCEDURE — 82306 VITAMIN D 25 HYDROXY: CPT

## 2022-10-20 PROCEDURE — 85025 COMPLETE CBC W/AUTO DIFF WBC: CPT

## 2022-10-20 PROCEDURE — 80053 COMPREHEN METABOLIC PANEL: CPT

## 2022-10-20 PROCEDURE — 82607 VITAMIN B-12: CPT

## 2022-10-20 PROCEDURE — 84100 ASSAY OF PHOSPHORUS: CPT

## 2022-10-20 PROCEDURE — 82570 ASSAY OF URINE CREATININE: CPT

## 2022-10-20 PROCEDURE — 84156 ASSAY OF PROTEIN URINE: CPT

## 2022-10-20 PROCEDURE — 86803 HEPATITIS C AB TEST: CPT

## 2022-10-20 PROCEDURE — 81001 URINALYSIS AUTO W/SCOPE: CPT

## 2022-10-20 PROCEDURE — 80061 LIPID PANEL: CPT

## 2022-10-20 PROCEDURE — 84466 ASSAY OF TRANSFERRIN: CPT

## 2022-10-20 PROCEDURE — 83540 ASSAY OF IRON: CPT

## 2022-10-20 PROCEDURE — 36415 COLL VENOUS BLD VENIPUNCTURE: CPT

## 2022-10-27 ENCOUNTER — TELEPHONE (OUTPATIENT)
Dept: FAMILY MEDICINE CLINIC | Age: 57
End: 2022-10-27

## 2022-10-27 NOTE — TELEPHONE ENCOUNTER
Caller: Iman Wiseman    Relationship: Self    Best call back number: [phone]    Caller requesting test results: Patient requesting to go over results     What test was performed: Labs     When was the test performed: 10/21/2022    Where was the test performed: [facility] [place]     Additional notes: Patient is calling requesting to go over lab results.

## 2022-12-13 DIAGNOSIS — I10 ESSENTIAL HYPERTENSION: ICD-10-CM

## 2022-12-14 RX ORDER — HYDROCHLOROTHIAZIDE 25 MG/1
TABLET ORAL
Qty: 90 TABLET | Refills: 1 | Status: SHIPPED | OUTPATIENT
Start: 2022-12-14

## 2023-03-10 ENCOUNTER — TRANSCRIBE ORDERS (OUTPATIENT)
Dept: ADMINISTRATIVE | Facility: HOSPITAL | Age: 58
End: 2023-03-10
Payer: COMMERCIAL

## 2023-03-10 DIAGNOSIS — Z12.31 SCREENING MAMMOGRAM FOR BREAST CANCER: Primary | ICD-10-CM

## 2023-05-18 ENCOUNTER — LAB (OUTPATIENT)
Dept: LAB | Facility: HOSPITAL | Age: 58
End: 2023-05-18
Payer: COMMERCIAL

## 2023-05-18 ENCOUNTER — OFFICE VISIT (OUTPATIENT)
Dept: FAMILY MEDICINE CLINIC | Age: 58
End: 2023-05-18
Payer: COMMERCIAL

## 2023-05-18 VITALS
WEIGHT: 235 LBS | HEIGHT: 66 IN | OXYGEN SATURATION: 96 % | BODY MASS INDEX: 37.77 KG/M2 | SYSTOLIC BLOOD PRESSURE: 134 MMHG | HEART RATE: 75 BPM | DIASTOLIC BLOOD PRESSURE: 84 MMHG

## 2023-05-18 DIAGNOSIS — Z12.11 COLON CANCER SCREENING: ICD-10-CM

## 2023-05-18 DIAGNOSIS — I10 ESSENTIAL HYPERTENSION: ICD-10-CM

## 2023-05-18 DIAGNOSIS — R91.1 LUNG NODULE: ICD-10-CM

## 2023-05-18 DIAGNOSIS — Z12.31 ENCOUNTER FOR SCREENING MAMMOGRAM FOR BREAST CANCER: ICD-10-CM

## 2023-05-18 DIAGNOSIS — E53.9 VITAMIN B DEFICIENCY: ICD-10-CM

## 2023-05-18 DIAGNOSIS — D50.8 IRON DEFICIENCY ANEMIA SECONDARY TO INADEQUATE DIETARY IRON INTAKE: ICD-10-CM

## 2023-05-18 DIAGNOSIS — Z00.00 ANNUAL PHYSICAL EXAM: Primary | ICD-10-CM

## 2023-05-18 DIAGNOSIS — Z01.419 WELL WOMAN EXAM: ICD-10-CM

## 2023-05-18 DIAGNOSIS — Z78.0 POSTMENOPAUSAL STATE: ICD-10-CM

## 2023-05-18 DIAGNOSIS — Z23 ENCOUNTER FOR IMMUNIZATION: ICD-10-CM

## 2023-05-18 DIAGNOSIS — E55.9 VITAMIN D DEFICIENCY, UNSPECIFIED: ICD-10-CM

## 2023-05-18 LAB
25(OH)D3 SERPL-MCNC: 34.1 NG/ML (ref 30–100)
ALBUMIN SERPL-MCNC: 3.9 G/DL (ref 3.5–5.2)
ALBUMIN/GLOB SERPL: 1.2 G/DL
ALP SERPL-CCNC: 116 U/L (ref 39–117)
ALT SERPL W P-5'-P-CCNC: 32 U/L (ref 1–33)
ANION GAP SERPL CALCULATED.3IONS-SCNC: 10.7 MMOL/L (ref 5–15)
AST SERPL-CCNC: 24 U/L (ref 1–32)
BILIRUB SERPL-MCNC: 0.3 MG/DL (ref 0–1.2)
BUN SERPL-MCNC: 19 MG/DL (ref 6–20)
BUN/CREAT SERPL: 18.6 (ref 7–25)
CALCIUM SPEC-SCNC: 9.6 MG/DL (ref 8.6–10.5)
CHLORIDE SERPL-SCNC: 103 MMOL/L (ref 98–107)
CHOLEST SERPL-MCNC: 165 MG/DL (ref 0–200)
CO2 SERPL-SCNC: 29.3 MMOL/L (ref 22–29)
CREAT SERPL-MCNC: 1.02 MG/DL (ref 0.57–1)
DEPRECATED RDW RBC AUTO: 42 FL (ref 37–54)
DEVELOPER EXPIRATION DATE: NORMAL
DEVELOPER LOT NUMBER: NORMAL
EGFRCR SERPLBLD CKD-EPI 2021: 63.9 ML/MIN/1.73
ERYTHROCYTE [DISTWIDTH] IN BLOOD BY AUTOMATED COUNT: 12.5 % (ref 12.3–15.4)
EXPIRATION DATE: NORMAL
FECAL OCCULT BLOOD SCREEN, POC: NEGATIVE
GLOBULIN UR ELPH-MCNC: 3.3 GM/DL
GLUCOSE SERPL-MCNC: 117 MG/DL (ref 65–99)
HCT VFR BLD AUTO: 39.1 % (ref 34–46.6)
HDLC SERPL-MCNC: 44 MG/DL (ref 40–60)
HGB BLD-MCNC: 12.5 G/DL (ref 12–15.9)
IRON 24H UR-MRATE: 63 MCG/DL (ref 37–145)
IRON SATN MFR SERPL: 21 % (ref 20–50)
LDLC SERPL CALC-MCNC: 103 MG/DL (ref 0–100)
LDLC/HDLC SERPL: 2.31 {RATIO}
Lab: 1512
MCH RBC QN AUTO: 29.2 PG (ref 26.6–33)
MCHC RBC AUTO-ENTMCNC: 32 G/DL (ref 31.5–35.7)
MCV RBC AUTO: 91.4 FL (ref 79–97)
NEGATIVE CONTROL: NEGATIVE
PLATELET # BLD AUTO: 249 10*3/MM3 (ref 140–450)
PMV BLD AUTO: 10 FL (ref 6–12)
POSITIVE CONTROL: POSITIVE
POTASSIUM SERPL-SCNC: 3.8 MMOL/L (ref 3.5–5.2)
PROT SERPL-MCNC: 7.2 G/DL (ref 6–8.5)
RBC # BLD AUTO: 4.28 10*6/MM3 (ref 3.77–5.28)
SODIUM SERPL-SCNC: 143 MMOL/L (ref 136–145)
TIBC SERPL-MCNC: 295 MCG/DL (ref 298–536)
TRANSFERRIN SERPL-MCNC: 198 MG/DL (ref 200–360)
TRIGL SERPL-MCNC: 96 MG/DL (ref 0–150)
VIT B12 BLD-MCNC: 442 PG/ML (ref 211–946)
VLDLC SERPL-MCNC: 18 MG/DL (ref 5–40)
WBC NRBC COR # BLD: 7.57 10*3/MM3 (ref 3.4–10.8)

## 2023-05-18 PROCEDURE — 85027 COMPLETE CBC AUTOMATED: CPT

## 2023-05-18 PROCEDURE — 80053 COMPREHEN METABOLIC PANEL: CPT

## 2023-05-18 PROCEDURE — 83540 ASSAY OF IRON: CPT

## 2023-05-18 PROCEDURE — 84466 ASSAY OF TRANSFERRIN: CPT

## 2023-05-18 PROCEDURE — 82306 VITAMIN D 25 HYDROXY: CPT

## 2023-05-18 PROCEDURE — 82607 VITAMIN B-12: CPT

## 2023-05-18 PROCEDURE — 36415 COLL VENOUS BLD VENIPUNCTURE: CPT

## 2023-05-18 PROCEDURE — 80061 LIPID PANEL: CPT

## 2023-05-18 RX ORDER — FERROUS SULFATE 325(65) MG
1 TABLET ORAL
Qty: 90 TABLET | Refills: 1 | Status: SHIPPED | OUTPATIENT
Start: 2023-05-18

## 2023-05-18 RX ORDER — HYDROCHLOROTHIAZIDE 25 MG/1
25 TABLET ORAL DAILY
Qty: 90 TABLET | Refills: 1 | Status: SHIPPED | OUTPATIENT
Start: 2023-05-18

## 2023-05-18 NOTE — PROGRESS NOTES
"Iman Wiseman presents to [facility].    Chief Complaint: Yearly physical    Subjective     History of Present Illness:  HPI   Patient is in today for yearly physical and WWE  GYNE:Dr Arango  Hysterectomy 4/22/1992  Last Pelvic:   Last Menses:1992  Last colonoscopy: 7/9/2019-polyps, repeat 2024  Last Mammogram: 4/22/22  Last DEXA: 4/22/22-NORMAL  Urinary symptoms:NONE, NO INCONTINENCE  Nocturia: NONE  Sexual concerns: NONE  EtOH use: NONE  Tobacco use: NONE  She wears a seatbelt in the car  Drug use: JONATHON  Last eye exam: REC YEARLY, JAN 2023  Dental exams every 6 months  IMMUNIZATIONS:  COVID BOOSTER AND SHINGRIX  STD: none   Abnormal PAP: never  Denies depression or anxiety  Exercise: walks 2 days a week  BMI 37-[facility] dietician and she has in the past and \"knows what to do\"    She has hypertension, [facility] healthy diet and daily exercise, wt loss, avoid NA.   She is overall stable and well-controlled on hydrochlorothiazide.  Her blood pressures at home are good.  She tolerates meds well without any side effects.     She recently went to the sleep specialist and was told she has mild sleep apnea.  She is not going to do CPAP at this time but instead try to work on diet and weight loss     She has chronic iron deficiency anemia and is taking iron daily.  She tolerates medication well and is due for labs       She also has vitamin D deficiency and takes 5000 units twice a week and is on B complex for B12 deficiency        In 2019 she had a CT of chest that showed 12 mm pleural-based noncalcified nodule, superior segment, left lower lobe that was stable on f/u CT             Review of Systems:  Review of Systems   Constitutional: Negative for chills, fatigue and fever.   HENT: Negative for ear pain, sinus pressure and sore throat.    Eyes: Negative for blurred vision and double vision.   Respiratory: Negative for cough, shortness of breath and wheezing.    Cardiovascular: Negative for " "chest pain and palpitations.   Gastrointestinal: Negative for abdominal pain, blood in stool, constipation, diarrhea, nausea and vomiting.   Skin: Negative for rash.   Neurological: Negative for dizziness and headache.   Psychiatric/Behavioral: Negative for sleep disturbance, suicidal ideas and depressed mood. The patient is not nervous/anxious.         Objective     Medications:  Current Outpatient Medications on File Prior to Visit   Medication Sig   • Cholecalciferol (Vitamin D-3) 125 MCG (5000 UT) tablet Take 1 tablet by mouth 2 (Two) Times a Week.   • Ergocalciferol 50 MCG (2000 UT) tablet Take 1 capsule by mouth 2 (Two) Times a Day.   • multivitamin with minerals tablet tablet Take 1 tablet by mouth Daily.   • [DISCONTINUED] B Complex Vitamins (vitamin b complex) capsule capsule Take 1 capsule by mouth Daily.   • [DISCONTINUED] ferrous sulfate (FeroSul) 325 (65 FE) MG tablet Take 1 tablet by mouth Daily With Breakfast.   • [DISCONTINUED] hydroCHLOROthiazide (HYDRODIURIL) 25 MG tablet TAKE ONE TABLET BY MOUTH DAILY   • [DISCONTINUED] Probiotic Product capsule Take 1 capsule by mouth. (Patient not taking: Reported on 5/18/2023)     No current facility-administered medications on file prior to visit.       Vital Signs:   /84 (BP Location: Left arm, Patient Position: Sitting)   Pulse 75   Ht 167.6 cm (65.98\")   Wt 107 kg (235 lb)   SpO2 96%   BMI 37.95 kg/m²       Physical Exam:  Physical Exam  Exam conducted with a chaperone present.   Constitutional:       General: She is not in acute distress.     Appearance: She is normal weight. She is not ill-appearing.   HENT:      Head: Normocephalic.      Right Ear: Tympanic membrane normal. There is no impacted cerumen.      Left Ear: Tympanic membrane normal. There is no impacted cerumen.      Mouth/Throat:      Mouth: Mucous membranes are moist.      Pharynx: Oropharynx is clear.   Eyes:      Extraocular Movements: Extraocular movements intact.      Pupils: " Pupils are equal, round, and reactive to light.   Neck:      Vascular: No carotid bruit.   Cardiovascular:      Rate and Rhythm: Normal rate and regular rhythm.      Pulses: Normal pulses.      Heart sounds: No murmur heard.  Pulmonary:      Effort: Pulmonary effort is normal.      Breath sounds: No wheezing, rhonchi or rales.   Chest:   Breasts:     Devonte Score is 5.      Right: Normal.      Left: Normal.   Abdominal:      General: Bowel sounds are normal.      Palpations: Abdomen is soft.      Tenderness: There is no abdominal tenderness.      Hernia: There is no hernia in the left inguinal area or right inguinal area.   Genitourinary:     General: Normal vulva.      Pubic Area: No rash.       Labia:         Right: No rash, tenderness, lesion or injury.         Left: No rash, tenderness, lesion or injury.       Urethra: No prolapse, urethral pain, urethral swelling or urethral lesion.      Vagina: Normal.      Uterus: Absent.       Adnexa: Right adnexa normal and left adnexa normal.      Rectum: Guaiac result negative. External hemorrhoid present. No mass, tenderness, anal fissure or internal hemorrhoid. Normal anal tone.   Musculoskeletal:         General: No swelling. Normal range of motion.      Cervical back: No rigidity or tenderness.   Lymphadenopathy:      Cervical: No cervical adenopathy.      Upper Body:      Right upper body: No axillary adenopathy.      Left upper body: No axillary adenopathy.      Lower Body: No right inguinal adenopathy. No left inguinal adenopathy.   Skin:     General: Skin is warm and dry.   Neurological:      Mental Status: She is alert.      Gait: Gait normal.   Psychiatric:         Mood and Affect: Mood normal.         Behavior: Behavior normal.         Judgment: Judgment normal.         Result Review   The following data was reviewed by Tiffani Arango MD on 05/18/2023.  Lab Results   Component Value Date    WBC 7.57 05/18/2023    HGB 12.5 05/18/2023    HCT 39.1 05/18/2023     MCV 91.4 05/18/2023     05/18/2023     Lab Results   Component Value Date    GLUCOSE 117 (H) 10/20/2022    BUN 16 10/20/2022    CREATININE 1.03 (H) 10/20/2022    EGFR 63.6 10/20/2022    BCR 15.5 10/20/2022    K 3.1 (L) 10/20/2022    CO2 30.0 (H) 10/20/2022    CALCIUM 9.4 10/20/2022    ALBUMIN 3.90 10/20/2022    BILITOT 0.4 10/20/2022    AST 30 10/20/2022    ALT 36 (H) 10/20/2022     Lab Results   Component Value Date    CHOL 172 10/20/2022    CHLPL 174 12/01/2020    TRIG 82 10/20/2022    HDL 47 10/20/2022     (H) 10/20/2022     Lab Results   Component Value Date    TSH 2.420 01/18/2022     Lab Results   Component Value Date    HGBA1C 5.60 07/15/2021     No results found for: PSA               Assessment and Plan:       Diagnoses and all orders for this visit:    1. Annual physical exam (Primary)    2. Well woman exam  Comments:  No Pap performed.  She is status post hysterectomy    3. Iron deficiency anemia secondary to inadequate dietary iron intake  Comments:  iron deficiency anemia, on iron, will add Vitamin c and recheck labs today  Orders:  -     ferrous sulfate (FeroSul) 325 (65 FE) MG tablet; Take 1 tablet by mouth Daily With Breakfast.  Dispense: 90 tablet; Refill: 1  -     Iron Profile; Future    4. Essential hypertension  Comments:  Stable and well-controlled with hydrochlorothiazide.  Recommend healthy diet, daily exercise and low-sodium diet.  Continue current meds  Orders:  -     hydroCHLOROthiazide (HYDRODIURIL) 25 MG tablet; Take 1 tablet by mouth Daily.  Dispense: 90 tablet; Refill: 1  -     Comprehensive Metabolic Panel; Future  -     CBC (No Diff); Future  -     Lipid Panel; Future    5. Encounter for screening mammogram for breast cancer  Comments:  Mammogram has been ordered but she needs to get an appointment in place today    6. Encounter for immunization  Comments:  Recommend Shingrix pneumonia and COVID booster and patient defers immunizations today    7. Colon cancer  screening  Comments:  Colonoscopy will be due in   Orders:  -     POC Occult Blood Stool    8. Postmenopausal state  Comments:  DEXA is up-to-date and normal bone density    9. Vitamin D deficiency, unspecified  -     Vitamin D 25 hydroxy; Future    10. Lung nodule  Comments:  CT reviewed and concerning lung nodule has essentially resolved and she has another benign lung nodule.  No follow-up needed    11. Vitamin B deficiency  -     Vitamin B12; Future          Follow Up   Return in about 6 months (around 2023) for Recheck.           Medical History:  Past Medical History:   • Acute kidney failure, unspecified   • Acute pharyngitis   • Anxiety disorder   • Chronic kidney disease, stage 2 (mild)   • Cutaneous abscess of abdominal wall   • Dyspnea, unspecified   • Elevated white blood cell count, unspecified   • Essential (primary) hypertension   • Family history of diabetes mellitus   • Generalized edema   • Hypocalcemia   • Infection following a procedure, unspecified, subsequent encounter   • Iron deficiency anemia   • Irritable bowel syndrome with diarrhea   • Mild intermittent asthma   • Other alopecia areata   • Other dorsalgia   • Other fatigue   • Other iron deficiency anemias   • Other long term (current) drug therapy   • Other ovarian cyst, left side   • Overweight   • Shortness of breath   • Solitary pulmonary nodule   • Unilateral primary osteoarthritis, left knee   • Vitamin D deficiency, unspecified     Past Surgical History:   •  SECTION   • CHOLECYSTECTOMY   • COLONOSCOPY   • ENDOSCOPY   • HYSTERECTOMY    right oopherectomy    • LAPAROSCOPY WITH LASER    abdominal abscess drainage/post op wound infection with wound vac      Family History   Problem Relation Age of Onset   • Hypertension Mother    • Ovarian cancer Mother    • Diabetes type II Mother    • Arthritis Mother    • Cancer Mother         uterine or cervix   • Diabetes Mother    • Hyperlipidemia Mother    • Cancer Father    •  Hypertension Sister    • Diabetes Sister    • Hyperlipidemia Sister    • Hypertension Brother    • Diabetes Brother    • Hyperlipidemia Brother    • Cancer Brother         prostrate   • Vision loss Maternal Aunt    • Vision loss Maternal Uncle      Social History     Tobacco Use   • Smoking status: Never   • Smokeless tobacco: Never   Substance Use Topics   • Alcohol use: Not Currently     Comment: social drinker once or twice a year       There are no preventive care reminders to display for this patient.     Immunization History   Administered Date(s) Administered   • COVID-19 (PFIZER) Purple Cap Monovalent 03/22/2021, 04/12/2021   • Covid-19 (Pfizer) Gray Cap Monovalent 02/17/2022   • Hepatitis A 04/25/2016   • Hepatitis B Adult/Adolescent IM 04/25/2016   • Tdap 01/30/2012, 03/10/2022       No Known Allergies

## 2023-06-15 ENCOUNTER — HOSPITAL ENCOUNTER (OUTPATIENT)
Dept: MAMMOGRAPHY | Facility: HOSPITAL | Age: 58
Discharge: HOME | End: 2023-06-15
Admitting: FAMILY MEDICINE
Payer: COMMERCIAL

## 2023-06-15 DIAGNOSIS — Z12.31 SCREENING MAMMOGRAM FOR BREAST CANCER: ICD-10-CM

## 2023-06-15 PROCEDURE — 77063 BREAST TOMOSYNTHESIS BI: CPT

## 2023-06-15 PROCEDURE — 77067 SCR MAMMO BI INCL CAD: CPT

## 2023-07-21 DIAGNOSIS — D50.8 IRON DEFICIENCY ANEMIA SECONDARY TO INADEQUATE DIETARY IRON INTAKE: ICD-10-CM

## 2023-07-24 RX ORDER — FERROUS SULFATE 325(65) MG
TABLET ORAL
Qty: 90 TABLET | Refills: 1 | Status: SHIPPED | OUTPATIENT
Start: 2023-07-24

## 2023-11-29 ENCOUNTER — TRANSCRIBE ORDERS (OUTPATIENT)
Dept: LAB | Facility: HOSPITAL | Age: 58
End: 2023-11-29
Payer: COMMERCIAL

## 2023-11-29 DIAGNOSIS — N18.2 CHRONIC KIDNEY DISEASE, STAGE II (MILD): Primary | ICD-10-CM

## 2023-11-30 ENCOUNTER — LAB (OUTPATIENT)
Dept: LAB | Facility: HOSPITAL | Age: 58
End: 2023-11-30
Payer: COMMERCIAL

## 2023-11-30 DIAGNOSIS — N18.2 CHRONIC KIDNEY DISEASE, STAGE II (MILD): ICD-10-CM

## 2023-11-30 LAB
CREAT UR-MCNC: 227.5 MG/DL
DEPRECATED RDW RBC AUTO: 42 FL (ref 37–54)
ERYTHROCYTE [DISTWIDTH] IN BLOOD BY AUTOMATED COUNT: 12.5 % (ref 12.3–15.4)
HCT VFR BLD AUTO: 42.4 % (ref 34–46.6)
HGB BLD-MCNC: 13.4 G/DL (ref 12–15.9)
MCH RBC QN AUTO: 28.6 PG (ref 26.6–33)
MCHC RBC AUTO-ENTMCNC: 31.6 G/DL (ref 31.5–35.7)
MCV RBC AUTO: 90.6 FL (ref 79–97)
PLATELET # BLD AUTO: 261 10*3/MM3 (ref 140–450)
PMV BLD AUTO: 9.7 FL (ref 6–12)
PROT ?TM UR-MCNC: 10.6 MG/DL
PROT/CREAT UR: 0.05 MG/G{CREAT}
PTH-INTACT SERPL-MCNC: 53.7 PG/ML (ref 15–65)
RBC # BLD AUTO: 4.68 10*6/MM3 (ref 3.77–5.28)
WBC NRBC COR # BLD AUTO: 7.56 10*3/MM3 (ref 3.4–10.8)

## 2023-11-30 PROCEDURE — 85027 COMPLETE CBC AUTOMATED: CPT

## 2023-11-30 PROCEDURE — 80069 RENAL FUNCTION PANEL: CPT

## 2023-11-30 PROCEDURE — 83970 ASSAY OF PARATHORMONE: CPT

## 2023-11-30 PROCEDURE — 82570 ASSAY OF URINE CREATININE: CPT

## 2023-11-30 PROCEDURE — 84156 ASSAY OF PROTEIN URINE: CPT

## 2023-11-30 PROCEDURE — 36415 COLL VENOUS BLD VENIPUNCTURE: CPT

## 2023-12-01 ENCOUNTER — LAB (OUTPATIENT)
Dept: LAB | Facility: HOSPITAL | Age: 58
End: 2023-12-01
Payer: COMMERCIAL

## 2023-12-01 DIAGNOSIS — N18.2 CHRONIC KIDNEY DISEASE, STAGE II (MILD): ICD-10-CM

## 2023-12-01 LAB
ALBUMIN SERPL-MCNC: 4.1 G/DL (ref 3.5–5.2)
ANION GAP SERPL CALCULATED.3IONS-SCNC: 12.7 MMOL/L (ref 5–15)
BILIRUB UR QL STRIP: NEGATIVE
BUN SERPL-MCNC: 13 MG/DL (ref 6–20)
BUN/CREAT SERPL: 13.5 (ref 7–25)
CALCIUM SPEC-SCNC: 10 MG/DL (ref 8.6–10.5)
CHLORIDE SERPL-SCNC: 102 MMOL/L (ref 98–107)
CLARITY UR: CLEAR
CO2 SERPL-SCNC: 26.3 MMOL/L (ref 22–29)
COLOR UR: YELLOW
CREAT SERPL-MCNC: 0.96 MG/DL (ref 0.57–1)
EGFRCR SERPLBLD CKD-EPI 2021: 68.7 ML/MIN/1.73
GLUCOSE SERPL-MCNC: 120 MG/DL (ref 65–99)
GLUCOSE UR STRIP-MCNC: NEGATIVE MG/DL
HGB UR QL STRIP.AUTO: NEGATIVE
KETONES UR QL STRIP: NEGATIVE
LEUKOCYTE ESTERASE UR QL STRIP.AUTO: NEGATIVE
NITRITE UR QL STRIP: NEGATIVE
PH UR STRIP.AUTO: 5.5 [PH] (ref 5–8)
PHOSPHATE SERPL-MCNC: 2.2 MG/DL (ref 2.5–4.5)
POTASSIUM SERPL-SCNC: 3.3 MMOL/L (ref 3.5–5.2)
PROT UR QL STRIP: NEGATIVE
SODIUM SERPL-SCNC: 141 MMOL/L (ref 136–145)
SP GR UR STRIP: 1.02 (ref 1–1.03)
UROBILINOGEN UR QL STRIP: NORMAL

## 2023-12-01 PROCEDURE — 81003 URINALYSIS AUTO W/O SCOPE: CPT

## 2023-12-21 ENCOUNTER — OFFICE VISIT (OUTPATIENT)
Dept: FAMILY MEDICINE CLINIC | Age: 58
End: 2023-12-21
Payer: COMMERCIAL

## 2023-12-21 VITALS
OXYGEN SATURATION: 98 % | SYSTOLIC BLOOD PRESSURE: 122 MMHG | WEIGHT: 230 LBS | DIASTOLIC BLOOD PRESSURE: 81 MMHG | HEIGHT: 66 IN | HEART RATE: 83 BPM | BODY MASS INDEX: 36.96 KG/M2

## 2023-12-21 DIAGNOSIS — E87.6 HYPOKALEMIA: ICD-10-CM

## 2023-12-21 DIAGNOSIS — E55.9 VITAMIN D DEFICIENCY, UNSPECIFIED: ICD-10-CM

## 2023-12-21 DIAGNOSIS — E53.9 VITAMIN B DEFICIENCY: ICD-10-CM

## 2023-12-21 DIAGNOSIS — I10 ESSENTIAL (PRIMARY) HYPERTENSION: ICD-10-CM

## 2023-12-21 DIAGNOSIS — N18.2 CHRONIC KIDNEY DISEASE, STAGE 2 (MILD): ICD-10-CM

## 2023-12-21 DIAGNOSIS — E66.01 CLASS 2 SEVERE OBESITY DUE TO EXCESS CALORIES WITH SERIOUS COMORBIDITY AND BODY MASS INDEX (BMI) OF 37.0 TO 37.9 IN ADULT: ICD-10-CM

## 2023-12-21 DIAGNOSIS — I10 ESSENTIAL HYPERTENSION: Primary | ICD-10-CM

## 2023-12-21 DIAGNOSIS — D50.8 IRON DEFICIENCY ANEMIA SECONDARY TO INADEQUATE DIETARY IRON INTAKE: ICD-10-CM

## 2023-12-21 DIAGNOSIS — R09.81 SINUS CONGESTION: ICD-10-CM

## 2023-12-21 RX ORDER — POTASSIUM CHLORIDE 20 MEQ/1
20 TABLET, EXTENDED RELEASE ORAL
COMMUNITY
Start: 2023-12-01 | End: 2024-11-30

## 2023-12-21 NOTE — PROGRESS NOTES
Iman Wiseman presents to [facility].    Chief Complaint: BP follow-up    Subjective     History of Present Illness:  HPI  She has hypertension.   She is overall stable and well-controlled on hydrochlorothiazide.  Her blood pressures at home are good.  She tolerates meds well without any side effects.      BMI 37.14, counseled today on healthy low calorie diet and daily exercise, wt loss, avoid NA    She has seen the sleep specialist and was told she has mild sleep apnea.  She is not going to do CPAP, once again I recommend low shraddha diet, daily exercise, and weight loss     She has chronic iron deficiency anemia and is taking iron daily.  She tolerates medication well and is due for labs       She also has vitamin D deficiency and takes 5000 units twice a week and is on B complex for B12 deficiency     History of CT of chest that showed 12 mm pleural-based noncalcified nodule, superior segment, left lower lobe that was stable on f/u CT     She presents with sinus congestion, onset one day ago with significant sinus congestion, she is wanting an antibiotic, no known covid or flu exposure.  No fever/cough/ST/ear pain/myalgias.  No meds tried.  She has spring and fall allergies    Result Review   The following data was reviewed by Tiffani Arango MD on 12/21/2023.  Lab Results   Component Value Date    WBC 7.56 11/30/2023    HGB 13.4 11/30/2023    HCT 42.4 11/30/2023    MCV 90.6 11/30/2023     11/30/2023     Lab Results   Component Value Date    GLUCOSE 120 (H) 11/30/2023    BUN 13 11/30/2023    CREATININE 0.96 11/30/2023    EGFR 68.7 11/30/2023    BCR 13.5 11/30/2023    K 3.3 (L) 11/30/2023    CO2 26.3 11/30/2023    CALCIUM 10.0 11/30/2023    ALBUMIN 4.1 11/30/2023    BILITOT 0.3 05/18/2023    AST 24 05/18/2023    ALT 32 05/18/2023     Lab Results   Component Value Date    CHOL 165 05/18/2023    CHLPL 174 12/01/2020    TRIG 96 05/18/2023    HDL 44 05/18/2023     (H)  "05/18/2023     Lab Results   Component Value Date    TSH 2.420 01/18/2022     Lab Results   Component Value Date    HGBA1C 5.60 07/15/2021     No results found for: \"PSA\"  Lab Results   Component Value Date    IRON 63 05/18/2023      Lab Results   Component Value Date    GKXO45AZ 34.1 05/18/2023               Assessment and Plan:   Diagnoses and all orders for this visit:    1. Essential hypertension (Primary)  Comments:  BP is stable and well-controlled with HCTZ.  No changes needed current meds/Tx plan.  Rec low shraddha diet and daily exercise and avoid NA in diet    2. Iron deficiency anemia secondary to inadequate dietary iron intake  Comments:  Continue iron supplementation and will check iron levels on next blood draw  Orders:  -     Iron Profile; Future    3. Vitamin D deficiency, unspecified  Comments:  Continue vitamin D 5000 units daily and check vitamin D levels and adjust medication pending lab results  Orders:  -     Vitamin D 25 hydroxy; Future    4. Vitamin B deficiency  Comments:  Continue over-the-counter B complex  Orders:  -     Vitamin B12; Future    5. Essential (primary) hypertension  -     Comprehensive metabolic panel; Future    6. Chronic kidney disease, stage 2 (mild)  Comments:  Mild insufficiency, she is to avoid NSAIDs and push fluids 64 ounces a day    7. Sinus congestion  Comments:  Looks viral versus allergies.  Rec flu/COVID testing she defers.  She will try Flonase nasal spray and NS spray and Mucinex DM.  She will call if no improvement    8. Hypokalemia  Comments:  She was started on potassium by nephrology.  I would recommend she initially start with 3 days a week potassium supplementation and will check a CMP in 1 month    9. Class 2 severe obesity due to excess calories with serious comorbidity and body mass index (BMI) of 37.0 to 37.9 in adult  Comments:  Recommend nutritionist referral and she defers.  Recommend [place] diet versus Mediterranean diet versus weight watchers " "and daily exercise              Objective     Medications:  Current Outpatient Medications   Medication Instructions    Ergocalciferol 50 MCG (2000 UT) tablet 1 capsule, Oral, 2 Times Daily    FeroSul 325 (65 Fe) MG tablet TAKE ONE TABLET BY MOUTH DAILY WITH BREAKFAST    hydroCHLOROthiazide (HYDRODIURIL) 25 mg, Oral, Daily    multivitamin with minerals tablet tablet 1 tablet, Oral, Daily    potassium chloride (K-DUR,KLOR-CON) 20 MEQ CR tablet 20 mEq, Oral    Vitamin D-3 5,000 Units, Oral, 2 Times Weekly        Vital Signs:   /81 (BP Location: Left arm, Patient Position: Sitting)   Pulse 83   Ht 167.6 cm (65.98\")   Wt 104 kg (230 lb)   SpO2 98%   BMI 37.14 kg/m²     Class 2 Severe Obesity (BMI >=35 and <=39.9). Obesity-related health conditions include the following: hypertension. Obesity is unchanged. BMI is is above average; BMI management plan is completed. We discussed portion control and increasing exercise.       Physical Exam:  Physical Exam  Vitals and nursing note reviewed.   Constitutional:       General: She is not in acute distress.     Appearance: Normal appearance. She is not ill-appearing, toxic-appearing or diaphoretic.   HENT:      Head: Normocephalic and atraumatic.      Right Ear: Tympanic membrane, ear canal and external ear normal.      Left Ear: Tympanic membrane, ear canal and external ear normal.      Nose: No congestion or rhinorrhea.      Mouth/Throat:      Mouth: Mucous membranes are moist.      Pharynx: Oropharynx is clear. No oropharyngeal exudate or posterior oropharyngeal erythema.   Eyes:      Extraocular Movements: Extraocular movements intact.      Conjunctiva/sclera: Conjunctivae normal.      Pupils: Pupils are equal, round, and reactive to light.   Cardiovascular:      Rate and Rhythm: Normal rate and regular rhythm.      Heart sounds: Normal heart sounds.   Pulmonary:      Effort: Pulmonary effort is normal.      Breath sounds: Normal breath sounds. No wheezing, rhonchi " or rales.   Abdominal:      General: Abdomen is flat.      Palpations: Abdomen is soft. There is no mass.      Tenderness: There is no abdominal tenderness.      Hernia: No hernia is present.   Musculoskeletal:      Cervical back: Neck supple. No rigidity.      Right lower leg: No edema.      Left lower leg: No edema.   Lymphadenopathy:      Cervical: No cervical adenopathy.   Skin:     General: Skin is warm and dry.   Neurological:      General: No focal deficit present.      Mental Status: She is alert and oriented to person, place, and time. Mental status is at baseline.   Psychiatric:         Mood and Affect: Mood normal.         Behavior: Behavior normal.         Thought Content: Thought content normal.         Judgment: Judgment normal.           Review of Systems:  Review of Systems   Constitutional:  Negative for chills, fatigue and fever.   HENT:  Negative for ear pain, sinus pressure and sore throat.    Eyes:  Negative for blurred vision and double vision.   Respiratory:  Negative for cough, shortness of breath and wheezing.    Cardiovascular:  Negative for chest pain and palpitations.   Gastrointestinal:  Negative for abdominal pain, blood in stool, constipation, diarrhea, nausea and vomiting.   Skin:  Negative for rash.   Neurological:  Negative for dizziness and headache.   Psychiatric/Behavioral:  Negative for sleep disturbance, suicidal ideas and depressed mood. The patient is not nervous/anxious.               Follow Up   Return in about 6 months (around 6/21/2024) for Annual physical.    Part of this note may be an electronic transcription/translation of spoken language to printed   text using the Dragon Dictation System.            Medical History:  There are no discontinued medications.   Past Medical History:    Acute kidney failure, unspecified    Acute pharyngitis    Anxiety disorder    Chronic kidney disease, stage 2 (mild)    Cutaneous abscess of abdominal wall    Dyspnea, unspecified     Elevated white blood cell count, unspecified    Essential (primary) hypertension    Family history of diabetes mellitus    Generalized edema    Hypocalcemia    Infection following a procedure, unspecified, subsequent encounter    Iron deficiency anemia    Irritable bowel syndrome with diarrhea    Mild intermittent asthma    Other alopecia areata    Other dorsalgia    Other fatigue    Other iron deficiency anemias    Other long term (current) drug therapy    Other ovarian cyst, left side    Overweight    Shortness of breath    Solitary pulmonary nodule    Unilateral primary osteoarthritis, left knee    Vitamin D deficiency, unspecified     Past Surgical History:     SECTION    CHOLECYSTECTOMY    COLONOSCOPY    ENDOSCOPY    HYSTERECTOMY    right oopherectomy     LAPAROSCOPY WITH LASER    abdominal abscess drainage/post op wound infection with wound vac      Family History   Problem Relation Age of Onset    Hypertension Mother     Ovarian cancer Mother     Diabetes type II Mother     Arthritis Mother     Cancer Mother         uterine or cervix    Diabetes Mother     Hyperlipidemia Mother     Cancer Father     Hypertension Sister     Diabetes Sister     Hyperlipidemia Sister     Hypertension Brother     Diabetes Brother     Hyperlipidemia Brother     Cancer Brother         prostrate    Vision loss Maternal Aunt     Vision loss Maternal Uncle      Social History     Tobacco Use    Smoking status: Never    Smokeless tobacco: Never   Substance Use Topics    Alcohol use: Not Currently     Comment: social drinker once or twice a year       Health Maintenance Due   Topic Date Due    BMI FOLLOWUP  2023        Immunization History   Administered Date(s) Administered    COVID-19 (PFIZER) Purple Cap Monovalent 2021, 2021    Covid-19 (Pfizer) Gray Cap Monovalent 2022    Hepatitis A 2016    Hepatitis B Adult/Adolescent IM 2016    Tdap 2012, 03/10/2022       No Known Allergies

## 2024-01-23 ENCOUNTER — TELEPHONE (OUTPATIENT)
Dept: FAMILY MEDICINE CLINIC | Age: 59
End: 2024-01-23
Payer: COMMERCIAL

## 2024-04-20 DIAGNOSIS — I10 ESSENTIAL HYPERTENSION: ICD-10-CM

## 2024-04-22 RX ORDER — HYDROCHLOROTHIAZIDE 25 MG/1
25 TABLET ORAL DAILY
Qty: 90 TABLET | Refills: 1 | Status: SHIPPED | OUTPATIENT
Start: 2024-04-22

## 2024-06-17 ENCOUNTER — TRANSCRIBE ORDERS (OUTPATIENT)
Dept: ADMINISTRATIVE | Facility: HOSPITAL | Age: 59
End: 2024-06-17
Payer: COMMERCIAL

## 2024-06-17 DIAGNOSIS — Z12.31 SCREENING MAMMOGRAM, ENCOUNTER FOR: Primary | ICD-10-CM

## 2024-06-19 ENCOUNTER — OFFICE VISIT (OUTPATIENT)
Dept: FAMILY MEDICINE CLINIC | Age: 59
End: 2024-06-19
Payer: COMMERCIAL

## 2024-06-19 ENCOUNTER — LAB (OUTPATIENT)
Dept: LAB | Facility: HOSPITAL | Age: 59
End: 2024-06-19
Payer: COMMERCIAL

## 2024-06-19 VITALS
OXYGEN SATURATION: 96 % | HEIGHT: 66 IN | HEART RATE: 86 BPM | TEMPERATURE: 98.3 F | DIASTOLIC BLOOD PRESSURE: 80 MMHG | WEIGHT: 225.6 LBS | SYSTOLIC BLOOD PRESSURE: 119 MMHG | BODY MASS INDEX: 36.26 KG/M2

## 2024-06-19 DIAGNOSIS — I10 ESSENTIAL (PRIMARY) HYPERTENSION: ICD-10-CM

## 2024-06-19 DIAGNOSIS — M79.672 LEFT FOOT PAIN: Primary | ICD-10-CM

## 2024-06-19 DIAGNOSIS — N18.2 CHRONIC KIDNEY DISEASE, STAGE 2 (MILD): ICD-10-CM

## 2024-06-19 DIAGNOSIS — D50.8 IRON DEFICIENCY ANEMIA SECONDARY TO INADEQUATE DIETARY IRON INTAKE: ICD-10-CM

## 2024-06-19 DIAGNOSIS — M79.672 LEFT FOOT PAIN: ICD-10-CM

## 2024-06-19 DIAGNOSIS — E53.9 VITAMIN B DEFICIENCY: ICD-10-CM

## 2024-06-19 DIAGNOSIS — E55.9 VITAMIN D DEFICIENCY, UNSPECIFIED: ICD-10-CM

## 2024-06-19 LAB
25(OH)D3 SERPL-MCNC: 35 NG/ML (ref 30–100)
ALBUMIN SERPL-MCNC: 4 G/DL (ref 3.5–5.2)
ALBUMIN/GLOB SERPL: 1.2 G/DL
ALP SERPL-CCNC: 109 U/L (ref 39–117)
ALT SERPL W P-5'-P-CCNC: 25 U/L (ref 1–33)
ANION GAP SERPL CALCULATED.3IONS-SCNC: 10 MMOL/L (ref 5–15)
AST SERPL-CCNC: 19 U/L (ref 1–32)
BASOPHILS # BLD AUTO: 0.04 10*3/MM3 (ref 0–0.2)
BASOPHILS NFR BLD AUTO: 0.4 % (ref 0–1.5)
BILIRUB SERPL-MCNC: 0.5 MG/DL (ref 0–1.2)
BUN SERPL-MCNC: 12 MG/DL (ref 6–20)
BUN/CREAT SERPL: 13.5 (ref 7–25)
CALCIUM SPEC-SCNC: 9.6 MG/DL (ref 8.6–10.5)
CHLORIDE SERPL-SCNC: 100 MMOL/L (ref 98–107)
CO2 SERPL-SCNC: 29 MMOL/L (ref 22–29)
CREAT SERPL-MCNC: 0.89 MG/DL (ref 0.57–1)
DEPRECATED RDW RBC AUTO: 41.3 FL (ref 37–54)
EGFRCR SERPLBLD CKD-EPI 2021: 74.8 ML/MIN/1.73
EOSINOPHIL # BLD AUTO: 0.15 10*3/MM3 (ref 0–0.4)
EOSINOPHIL NFR BLD AUTO: 1.7 % (ref 0.3–6.2)
ERYTHROCYTE [DISTWIDTH] IN BLOOD BY AUTOMATED COUNT: 12.2 % (ref 12.3–15.4)
GLOBULIN UR ELPH-MCNC: 3.4 GM/DL
GLUCOSE SERPL-MCNC: 133 MG/DL (ref 65–99)
HCT VFR BLD AUTO: 42.8 % (ref 34–46.6)
HGB BLD-MCNC: 13.7 G/DL (ref 12–15.9)
IMM GRANULOCYTES # BLD AUTO: 0.02 10*3/MM3 (ref 0–0.05)
IMM GRANULOCYTES NFR BLD AUTO: 0.2 % (ref 0–0.5)
IRON 24H UR-MRATE: 52 MCG/DL (ref 37–145)
IRON SATN MFR SERPL: 19 % (ref 20–50)
LYMPHOCYTES # BLD AUTO: 1.99 10*3/MM3 (ref 0.7–3.1)
LYMPHOCYTES NFR BLD AUTO: 22.3 % (ref 19.6–45.3)
MCH RBC QN AUTO: 28.9 PG (ref 26.6–33)
MCHC RBC AUTO-ENTMCNC: 32 G/DL (ref 31.5–35.7)
MCV RBC AUTO: 90.3 FL (ref 79–97)
MONOCYTES # BLD AUTO: 0.42 10*3/MM3 (ref 0.1–0.9)
MONOCYTES NFR BLD AUTO: 4.7 % (ref 5–12)
NEUTROPHILS NFR BLD AUTO: 6.32 10*3/MM3 (ref 1.7–7)
NEUTROPHILS NFR BLD AUTO: 70.7 % (ref 42.7–76)
PLATELET # BLD AUTO: 261 10*3/MM3 (ref 140–450)
PMV BLD AUTO: 9.9 FL (ref 6–12)
POTASSIUM SERPL-SCNC: 3.1 MMOL/L (ref 3.5–5.2)
PROT SERPL-MCNC: 7.4 G/DL (ref 6–8.5)
RBC # BLD AUTO: 4.74 10*6/MM3 (ref 3.77–5.28)
SODIUM SERPL-SCNC: 139 MMOL/L (ref 136–145)
TIBC SERPL-MCNC: 280 MCG/DL (ref 298–536)
TRANSFERRIN SERPL-MCNC: 188 MG/DL (ref 200–360)
URATE SERPL-MCNC: 8 MG/DL (ref 2.4–5.7)
VIT B12 BLD-MCNC: 327 PG/ML (ref 211–946)
WBC NRBC COR # BLD AUTO: 8.94 10*3/MM3 (ref 3.4–10.8)

## 2024-06-19 PROCEDURE — 85025 COMPLETE CBC W/AUTO DIFF WBC: CPT

## 2024-06-19 PROCEDURE — 80053 COMPREHEN METABOLIC PANEL: CPT

## 2024-06-19 PROCEDURE — 99213 OFFICE O/P EST LOW 20 MIN: CPT

## 2024-06-19 PROCEDURE — 36415 COLL VENOUS BLD VENIPUNCTURE: CPT

## 2024-06-19 PROCEDURE — 82306 VITAMIN D 25 HYDROXY: CPT

## 2024-06-19 PROCEDURE — 84466 ASSAY OF TRANSFERRIN: CPT

## 2024-06-19 PROCEDURE — 82607 VITAMIN B-12: CPT

## 2024-06-19 PROCEDURE — 84550 ASSAY OF BLOOD/URIC ACID: CPT

## 2024-06-19 PROCEDURE — 83540 ASSAY OF IRON: CPT

## 2024-06-19 RX ORDER — METHYLPREDNISOLONE 4 MG/1
TABLET ORAL
Qty: 21 TABLET | Refills: 0 | Status: SHIPPED | OUTPATIENT
Start: 2024-06-19

## 2024-06-19 NOTE — PROGRESS NOTES
Subjective     CHIEF COMPLAINT    Chief Complaint   Patient presents with    Foot Pain     Right foot pain x 1 month ago, x 3 weeks ago went to urgent care d/t left foot being swollen and pain.  Was told she had arthritis in toe of left foot and bone spurs.  Still having pain     History of Present Illness  Patient is a 59-year-old female, presenting to the clinic today with complaints of left foot pain.  She reports that she went to the urgent care about 3 weeks ago ([facility]) for left great toe swelling and pain.  She reports they completed an x-ray that showed arthritis in her left great toe and a bone spur in her left heel.  They gave her some colchicine which she reports helped her symptoms a little bit but they returned a few days later.  She complains of pain in the great toe and swelling if she walks on it all day.  She cannot take ibuprofen due to kidney function and has been taking Tylenol with no relief in symptoms.  She has not necessarily noticed any redness to the joint but does notice that it feels warm.  Pain is constant.  She also states sometimes it feels tingly.  No known trauma, injury or falls.  No fevers or chills.    Review of Systems   Constitutional:  Negative for chills and fever.   Musculoskeletal:  Positive for arthralgias and joint swelling.   Skin:  Negative for color change, rash and wound.     No Known Allergies    Current Outpatient Medications on File Prior to Visit   Medication Sig Dispense Refill    Cholecalciferol (Vitamin D-3) 125 MCG (5000 UT) tablet Take 1 tablet by mouth 2 (Two) Times a Week. (Patient taking differently: Take 1 tablet by mouth Daily.) 50 tablet 1    FeroSul 325 (65 Fe) MG tablet TAKE ONE TABLET BY MOUTH DAILY WITH BREAKFAST 90 tablet 1    hydroCHLOROthiazide 25 MG tablet TAKE 1 TABLET BY MOUTH DAILY 90 tablet 1    multivitamin with minerals tablet tablet Take 1 tablet by mouth Daily.      [DISCONTINUED] Ergocalciferol 50 MCG (2000 UT) tablet Take 1 capsule  "by mouth 2 (Two) Times a Day. (Patient not taking: Reported on 6/19/2024)      [DISCONTINUED] potassium chloride (K-DUR,KLOR-CON) 20 MEQ CR tablet Take 1 tablet by mouth. (Patient not taking: Reported on 6/19/2024)       No current facility-administered medications on file prior to visit.     /80 (BP Location: Right arm, Patient Position: Sitting, Cuff Size: Large Adult)   Pulse 86   Temp 98.3 °F (36.8 °C) (Oral)   Ht 167.6 cm (65.98\")   Wt 102 kg (225 lb 9.6 oz)   SpO2 96%   BMI 36.43 kg/m²     Objective     Physical Exam  Vitals and nursing note reviewed.   Constitutional:       General: She is not in acute distress.     Appearance: Normal appearance. She is not ill-appearing.   HENT:      Head: Normocephalic.   Cardiovascular:      Pulses:           Dorsalis pedis pulses are 2+ on the left side.        Posterior tibial pulses are 2+ on the left side.   Pulmonary:      Effort: Pulmonary effort is normal. No accessory muscle usage or respiratory distress.   Musculoskeletal:        Feet:    Feet:      Left foot:      Skin integrity: No skin breakdown, erythema or warmth.      Comments: Tenderness to palpation in area above to left foot. No wounds, rashes or lesions noted. No erythema, ecchymosis noted.   Skin:     General: Skin is warm and dry.      Capillary Refill: Capillary refill takes less than 2 seconds.   Neurological:      General: No focal deficit present.      Mental Status: She is alert and oriented to person, place, and time.   Psychiatric:         Mood and Affect: Mood normal.         Behavior: Behavior normal.         Results for orders placed or performed in visit on 06/19/24   CBC Auto Differential    Specimen: Blood   Result Value Ref Range    WBC 8.94 3.40 - 10.80 10*3/mm3    RBC 4.74 3.77 - 5.28 10*6/mm3    Hemoglobin 13.7 12.0 - 15.9 g/dL    Hematocrit 42.8 34.0 - 46.6 %    MCV 90.3 79.0 - 97.0 fL    MCH 28.9 26.6 - 33.0 pg    MCHC 32.0 31.5 - 35.7 g/dL    RDW 12.2 (L) 12.3 - 15.4 % "    RDW-SD 41.3 37.0 - 54.0 fl    MPV 9.9 6.0 - 12.0 fL    Platelets 261 140 - 450 10*3/mm3    Neutrophil % 70.7 42.7 - 76.0 %    Lymphocyte % 22.3 19.6 - 45.3 %    Monocyte % 4.7 (L) 5.0 - 12.0 %    Eosinophil % 1.7 0.3 - 6.2 %    Basophil % 0.4 0.0 - 1.5 %    Immature Grans % 0.2 0.0 - 0.5 %    Neutrophils, Absolute 6.32 1.70 - 7.00 10*3/mm3    Lymphocytes, Absolute 1.99 0.70 - 3.10 10*3/mm3    Monocytes, Absolute 0.42 0.10 - 0.90 10*3/mm3    Eosinophils, Absolute 0.15 0.00 - 0.40 10*3/mm3    Basophils, Absolute 0.04 0.00 - 0.20 10*3/mm3    Immature Grans, Absolute 0.02 0.00 - 0.05 10*3/mm3            Assessment & Plan  Left foot pain  Will request x-ray from [facility] for further evaluation.  CBC ordered, no elevation in white blood cell count noted.  Will treat with Medrol Dosepak as below.  Also ordered uric acid level for further evaluation.  Referral also sent to podiatry for further management. Patient has follow up with PCP on 7/1/24. Return precautions discussed.   Chronic kidney disease, stage 2 (mild)  Avoid NSAIDs.    Orders Placed This Encounter   Procedures    Uric acid    Ambulatory Referral to Podiatry    CBC w AUTO Differential     New Medications Ordered This Visit   Medications    methylPREDNISolone (MEDROL) 4 MG dose pack     Sig: Take as directed on package instructions.     Dispense:  21 tablet     Refill:  0         Follow-up:  Return if symptoms worsen or fail to improve.  Patient was given instructions and counseling regarding her condition or for health maintenance advice. Please see specific information pulled into the AVS if appropriate.

## 2024-06-25 ENCOUNTER — DOCUMENTATION (OUTPATIENT)
Dept: FAMILY MEDICINE CLINIC | Age: 59
End: 2024-06-25
Payer: COMMERCIAL

## 2024-06-25 DIAGNOSIS — M10.9 ACUTE GOUT, UNSPECIFIED CAUSE, UNSPECIFIED SITE: Primary | ICD-10-CM

## 2024-06-25 RX ORDER — ALLOPURINOL 100 MG/1
100 TABLET ORAL DAILY
Qty: 90 TABLET | Refills: 1 | Status: SHIPPED | OUTPATIENT
Start: 2024-06-25

## 2024-06-27 DIAGNOSIS — R93.89 ABNORMAL X-RAY: Primary | ICD-10-CM

## 2024-06-27 RX ORDER — SPIRONOLACTONE 25 MG/1
25 TABLET ORAL DAILY
Qty: 90 TABLET | Refills: 1 | Status: SHIPPED | OUTPATIENT
Start: 2024-06-27

## 2024-07-01 ENCOUNTER — OFFICE VISIT (OUTPATIENT)
Dept: FAMILY MEDICINE CLINIC | Age: 59
End: 2024-07-01
Payer: COMMERCIAL

## 2024-07-01 VITALS
DIASTOLIC BLOOD PRESSURE: 82 MMHG | HEIGHT: 66 IN | BODY MASS INDEX: 36.26 KG/M2 | TEMPERATURE: 97.3 F | HEART RATE: 73 BPM | SYSTOLIC BLOOD PRESSURE: 132 MMHG | OXYGEN SATURATION: 95 % | WEIGHT: 225.6 LBS

## 2024-07-01 DIAGNOSIS — N18.2 CHRONIC KIDNEY DISEASE, STAGE 2 (MILD): ICD-10-CM

## 2024-07-01 DIAGNOSIS — E55.9 VITAMIN D DEFICIENCY, UNSPECIFIED: ICD-10-CM

## 2024-07-01 DIAGNOSIS — M10.9 ACUTE GOUT, UNSPECIFIED CAUSE, UNSPECIFIED SITE: Primary | ICD-10-CM

## 2024-07-01 DIAGNOSIS — M79.672 LEFT FOOT PAIN: ICD-10-CM

## 2024-07-01 DIAGNOSIS — E87.6 HYPOKALEMIA: ICD-10-CM

## 2024-07-01 DIAGNOSIS — D50.8 IRON DEFICIENCY ANEMIA SECONDARY TO INADEQUATE DIETARY IRON INTAKE: ICD-10-CM

## 2024-07-01 DIAGNOSIS — I10 ESSENTIAL HYPERTENSION: ICD-10-CM

## 2024-07-01 PROCEDURE — 99214 OFFICE O/P EST MOD 30 MIN: CPT | Performed by: FAMILY MEDICINE

## 2024-07-01 RX ORDER — COLCHICINE 0.6 MG/1
0.6 TABLET ORAL DAILY
Qty: 20 TABLET | Refills: 0 | Status: SHIPPED | OUTPATIENT
Start: 2024-07-01

## 2024-07-01 NOTE — PROGRESS NOTES
"Iman Wiseman presents to [facility].    Chief Complaint: Foot pain and gout    Subjective     History of Present Illness:  HPI     Acute onset of  x-ray of the left foot on Cathy 3, 2024 showed no acute fractures, degenerative changes scattered throughout the foot, calcification adjacent to the first metatarsal head which could represent prior trauma or gout.  Uric acid levels were high indicating gout.  She was treated with colchicine by the urgent care without significant improvement.  She was then treated with a Medrol Dosepak and her pain became tolerable but she is still in moderate pain and having trouble walking on that left ankle and great toe.  She also has allopurinol but has not initiated this medication    In addition her potassium levels were low.  She is on HCTZ for blood pressure and for lower extremity edema and I will change her with her new diagnosis of gout to spironolactone which will also be potassium sparing and help improve her potassium levels.  Will repeat labs in 2 weeks    Result Review   The following data was reviewed by Tiffani Arango MD on 07/01/2024.  Lab Results   Component Value Date    WBC 8.94 06/19/2024    HGB 13.7 06/19/2024    HCT 42.8 06/19/2024    MCV 90.3 06/19/2024     06/19/2024     Lab Results   Component Value Date    GLUCOSE 133 (H) 06/19/2024    BUN 12 06/19/2024    CREATININE 0.89 06/19/2024    EGFR 74.8 06/19/2024    BCR 13.5 06/19/2024    K 3.1 (L) 06/19/2024    CO2 29.0 06/19/2024    CALCIUM 9.6 06/19/2024    ALBUMIN 4.0 06/19/2024    BILITOT 0.5 06/19/2024    AST 19 06/19/2024    ALT 25 06/19/2024     Lab Results   Component Value Date    CHOL 165 05/18/2023    CHLPL 174 12/01/2020    TRIG 96 05/18/2023    HDL 44 05/18/2023     (H) 05/18/2023     Lab Results   Component Value Date    TSH 2.420 01/18/2022     Lab Results   Component Value Date    HGBA1C 5.60 07/15/2021     No results found for: \"PSA\"  Lab " Results   Component Value Date    IRON 52 06/19/2024      Lab Results   Component Value Date    AZLY75CG 35.0 06/19/2024               Assessment and Plan:   Diagnoses and all orders for this visit:    1. Acute gout, unspecified cause, unspecified site (Primary)  Comments:  Will restart colchicine for acute gout symptoms and she has allopurinol and she is to start this medication, too, follow-up if no improvement/worsening symptoms  Orders:  -     colchicine 0.6 MG tablet; Take 1 tablet by mouth Daily.  Dispense: 20 tablet; Refill: 0    2. Left foot pain  Comments:  Due to gout    3. Essential hypertension  Comments:  Stable and well-controlled on spironolactone, changed from HCTZ due to low potassium and gout.  No changes needed current meds or Tx plan  Orders:  -     Basic metabolic panel; Future    4. Chronic kidney disease, stage 2 (mild)  Comments:  Push fluids 64 ounces a day, she is to avoid NSAIDs    5. Iron deficiency anemia secondary to inadequate dietary iron intake  Comments:  On iron supplementation and tolerates well.  No issues with constipation.    6. Vitamin D deficiency, unspecified  Comments:  Stable on vitamin D supplementation.  No changes needed current meds or Tx plan  Orders:  -     Cholecalciferol (Vitamin D-3) 125 MCG (5000 UT) tablet; Take 1 tablet by mouth Daily.    7. Hypokalemia  Comments:  Will repeat a BMP in 2 weeks  Orders:  -     Basic metabolic panel; Future              Objective     Medications:  Current Outpatient Medications   Medication Instructions    allopurinol (ZYLOPRIM) 100 mg, Oral, Daily    colchicine 0.6 mg, Oral, Daily    FeroSul 325 (65 Fe) MG tablet TAKE ONE TABLET BY MOUTH DAILY WITH BREAKFAST    multivitamin with minerals tablet tablet 1 tablet, Oral, Daily    spironolactone (ALDACTONE) 25 mg, Oral, Daily    Vitamin D-3 5,000 Units, Oral, Daily        Vital Signs:   /82 (BP Location: Right arm, Patient Position: Sitting)   Pulse 73   Temp 97.3 °F (36.3  "°C) (Temporal)   Ht 167.6 cm (65.98\")   Wt 102 kg (225 lb 9.6 oz)   SpO2 95%   BMI 36.43 kg/m²             Physical Exam:  Physical Exam  Vitals and nursing note reviewed.   Constitutional:       General: She is not in acute distress.     Appearance: Normal appearance. She is not ill-appearing, toxic-appearing or diaphoretic.   HENT:      Head: Normocephalic and atraumatic.      Right Ear: Tympanic membrane, ear canal and external ear normal.      Left Ear: Tympanic membrane, ear canal and external ear normal.      Nose: No congestion or rhinorrhea.      Mouth/Throat:      Mouth: Mucous membranes are moist.      Pharynx: Oropharynx is clear. No oropharyngeal exudate or posterior oropharyngeal erythema.   Eyes:      Extraocular Movements: Extraocular movements intact.      Conjunctiva/sclera: Conjunctivae normal.      Pupils: Pupils are equal, round, and reactive to light.   Cardiovascular:      Rate and Rhythm: Normal rate and regular rhythm.      Heart sounds: Normal heart sounds.   Pulmonary:      Effort: Pulmonary effort is normal.      Breath sounds: Normal breath sounds. No wheezing, rhonchi or rales.   Abdominal:      General: Abdomen is flat.      Palpations: Abdomen is soft. There is no mass.      Tenderness: There is no abdominal tenderness.      Hernia: No hernia is present.   Musculoskeletal:      Cervical back: Neck supple. No rigidity.      Right lower leg: No edema.      Left lower leg: No edema.        Feet:    Lymphadenopathy:      Cervical: No cervical adenopathy.   Skin:     General: Skin is warm and dry.   Neurological:      General: No focal deficit present.      Mental Status: She is alert and oriented to person, place, and time. Mental status is at baseline.   Psychiatric:         Mood and Affect: Mood normal.         Behavior: Behavior normal.         Thought Content: Thought content normal.         Judgment: Judgment normal.         Review of Systems:  Review of Systems   Constitutional:  " Negative for chills, fatigue and fever.   HENT:  Negative for ear pain, sinus pressure and sore throat.    Eyes:  Negative for blurred vision and double vision.   Respiratory:  Negative for cough, shortness of breath and wheezing.    Cardiovascular:  Negative for chest pain and palpitations.   Gastrointestinal:  Negative for abdominal pain, blood in stool, constipation, diarrhea, nausea and vomiting.   Musculoskeletal:  Positive for arthralgias.   Skin:  Negative for rash.   Neurological:  Negative for dizziness and headache.   Psychiatric/Behavioral:  Negative for sleep disturbance, suicidal ideas and depressed mood. The patient is not nervous/anxious.               Follow Up   No follow-ups on file.    Part of this note may be an electronic transcription/translation of spoken language to printed   text using the Dragon Dictation System.            Medical History:  Medications Discontinued During This Encounter   Medication Reason    methylPREDNISolone (MEDROL) 4 MG dose pack *Therapy completed    Cholecalciferol (Vitamin D-3) 125 MCG (5000 UT) tablet Reorder      Past Medical History:    Acute kidney failure, unspecified    Acute pharyngitis    Anxiety disorder    Chronic kidney disease, stage 2 (mild)    Cutaneous abscess of abdominal wall    Dyspnea, unspecified    Elevated white blood cell count, unspecified    Essential (primary) hypertension    Family history of diabetes mellitus    Generalized edema    Hypocalcemia    Infection following a procedure, unspecified, subsequent encounter    Iron deficiency anemia    Irritable bowel syndrome with diarrhea    Mild intermittent asthma    Other alopecia areata    Other dorsalgia    Other fatigue    Other iron deficiency anemias    Other long term (current) drug therapy    Other ovarian cyst, left side    Overweight    Shortness of breath    Solitary pulmonary nodule    Unilateral primary osteoarthritis, left knee    Vitamin D deficiency, unspecified     Past  Surgical History:     SECTION    CHOLECYSTECTOMY    COLONOSCOPY    ENDOSCOPY    HYSTERECTOMY    right oopherectomy     LAPAROSCOPY WITH LASER    abdominal abscess drainage/post op wound infection with wound vac      Family History   Problem Relation Age of Onset    Hypertension Mother     Ovarian cancer Mother     Diabetes type II Mother     Arthritis Mother     Cancer Mother         uterine or cervix    Diabetes Mother     Hyperlipidemia Mother     Cancer Father     Hypertension Sister     Diabetes Sister     Hyperlipidemia Sister     Hypertension Brother     Diabetes Brother     Hyperlipidemia Brother     Cancer Brother         prostrate    Vision loss Maternal Aunt     Vision loss Maternal Uncle      Social History     Tobacco Use    Smoking status: Never    Smokeless tobacco: Never   Substance Use Topics    Alcohol use: Not Currently     Comment: social drinker once or twice a year       Health Maintenance Due   Topic Date Due    ZOSTER VACCINE (1 of 2) Never done    COVID-19 Vaccine ( season) 2023    ANNUAL PHYSICAL  2024        Immunization History   Administered Date(s) Administered    COVID-19 (PFIZER) Purple Cap Monovalent 2021, 2021    Covid-19 (Pfizer) Gray Cap Monovalent 2022    Hepatitis A 2016    Hepatitis B Adult/Adolescent IM 2016    Tdap 2012, 03/10/2022       No Known Allergies

## 2024-07-11 ENCOUNTER — HOSPITAL ENCOUNTER (OUTPATIENT)
Dept: MAMMOGRAPHY | Facility: HOSPITAL | Age: 59
Discharge: HOME | End: 2024-07-11
Admitting: FAMILY MEDICINE
Payer: COMMERCIAL

## 2024-07-11 DIAGNOSIS — Z12.31 SCREENING MAMMOGRAM, ENCOUNTER FOR: ICD-10-CM

## 2024-07-11 PROCEDURE — 77063 BREAST TOMOSYNTHESIS BI: CPT

## 2024-07-11 PROCEDURE — 77067 SCR MAMMO BI INCL CAD: CPT

## 2024-08-09 DIAGNOSIS — M10.9 ACUTE GOUT, UNSPECIFIED CAUSE, UNSPECIFIED SITE: ICD-10-CM

## 2024-08-09 NOTE — TELEPHONE ENCOUNTER
"  Caller: Iman Wiseman \"Esther\"    Relationship: Self    Best call back number: [phone]    Requested Prescriptions:   Requested Prescriptions     Pending Prescriptions Disp Refills    colchicine 0.6 MG tablet 20 tablet 0     Sig: Take 1 tablet by mouth Daily.        Pharmacy where request should be sent: [facility] [id number] [place], [place] - [address] [facility] - [phone]  - [phone] FX     Last office visit with prescribing clinician: 7/1/2024   Last telemedicine visit with prescribing clinician: Visit date not found   Next office visit with prescribing clinician: 1/16/2025       Does the patient have less than a 3 day supply:  [x] Yes  [] No    Would you like a call back once the refill request has been completed: [] Yes [x] No    If the office needs to give you a call back, can they leave a voicemail: [] Yes [x] No    Cindy Nina   08/09/24 10:22 EDT         "

## 2024-08-12 RX ORDER — COLCHICINE 0.6 MG/1
0.6 TABLET ORAL DAILY
Qty: 20 TABLET | Refills: 2 | Status: SHIPPED | OUTPATIENT
Start: 2024-08-12

## 2024-09-18 DIAGNOSIS — D50.8 IRON DEFICIENCY ANEMIA SECONDARY TO INADEQUATE DIETARY IRON INTAKE: ICD-10-CM

## 2024-09-20 RX ORDER — FERROUS SULFATE 325(65) MG
1 TABLET ORAL
Qty: 90 TABLET | Refills: 0 | Status: SHIPPED | OUTPATIENT
Start: 2024-09-20

## 2024-10-25 ENCOUNTER — TELEPHONE (OUTPATIENT)
Dept: FAMILY MEDICINE CLINIC | Age: 59
End: 2024-10-25
Payer: COMMERCIAL

## 2024-11-05 ENCOUNTER — LAB (OUTPATIENT)
Dept: LAB | Facility: HOSPITAL | Age: 59
End: 2024-11-05
Payer: COMMERCIAL

## 2024-11-05 DIAGNOSIS — E87.6 HYPOKALEMIA: ICD-10-CM

## 2024-11-05 DIAGNOSIS — M10.9 ACUTE GOUT, UNSPECIFIED CAUSE, UNSPECIFIED SITE: ICD-10-CM

## 2024-11-05 DIAGNOSIS — I10 ESSENTIAL HYPERTENSION: ICD-10-CM

## 2024-11-05 LAB
ANION GAP SERPL CALCULATED.3IONS-SCNC: 7.9 MMOL/L (ref 5–15)
BUN SERPL-MCNC: 15 MG/DL (ref 6–20)
BUN/CREAT SERPL: 14.6 (ref 7–25)
CALCIUM SPEC-SCNC: 9.7 MG/DL (ref 8.6–10.5)
CHLORIDE SERPL-SCNC: 108 MMOL/L (ref 98–107)
CO2 SERPL-SCNC: 28.1 MMOL/L (ref 22–29)
CREAT SERPL-MCNC: 1.03 MG/DL (ref 0.57–1)
EGFRCR SERPLBLD CKD-EPI 2021: 62.8 ML/MIN/1.73
GLUCOSE SERPL-MCNC: 106 MG/DL (ref 65–99)
POTASSIUM SERPL-SCNC: 4.2 MMOL/L (ref 3.5–5.2)
SODIUM SERPL-SCNC: 144 MMOL/L (ref 136–145)
URATE SERPL-MCNC: 5.4 MG/DL (ref 2.4–5.7)

## 2024-11-05 PROCEDURE — 84550 ASSAY OF BLOOD/URIC ACID: CPT

## 2024-11-05 PROCEDURE — 80048 BASIC METABOLIC PNL TOTAL CA: CPT

## 2024-11-05 PROCEDURE — 36415 COLL VENOUS BLD VENIPUNCTURE: CPT

## 2025-01-10 ENCOUNTER — TELEMEDICINE (OUTPATIENT)
Dept: FAMILY MEDICINE CLINIC | Age: 60
End: 2025-01-10
Payer: COMMERCIAL

## 2025-01-10 VITALS
WEIGHT: 225 LBS | BODY MASS INDEX: 36.33 KG/M2 | HEART RATE: 69 BPM | DIASTOLIC BLOOD PRESSURE: 80 MMHG | SYSTOLIC BLOOD PRESSURE: 123 MMHG

## 2025-01-10 DIAGNOSIS — E53.9 VITAMIN B DEFICIENCY: ICD-10-CM

## 2025-01-10 DIAGNOSIS — E87.6 HYPOKALEMIA: ICD-10-CM

## 2025-01-10 DIAGNOSIS — M10.9 ACUTE GOUT, UNSPECIFIED CAUSE, UNSPECIFIED SITE: ICD-10-CM

## 2025-01-10 DIAGNOSIS — E66.812 CLASS 2 SEVERE OBESITY DUE TO EXCESS CALORIES WITH SERIOUS COMORBIDITY AND BODY MASS INDEX (BMI) OF 36.0 TO 36.9 IN ADULT: ICD-10-CM

## 2025-01-10 DIAGNOSIS — Z78.0 MENOPAUSE: ICD-10-CM

## 2025-01-10 DIAGNOSIS — Z23 ENCOUNTER FOR IMMUNIZATION: ICD-10-CM

## 2025-01-10 DIAGNOSIS — R73.03 BORDERLINE DIABETES: ICD-10-CM

## 2025-01-10 DIAGNOSIS — E55.9 VITAMIN D DEFICIENCY, UNSPECIFIED: ICD-10-CM

## 2025-01-10 DIAGNOSIS — I10 ESSENTIAL HYPERTENSION: ICD-10-CM

## 2025-01-10 DIAGNOSIS — Z12.31 SCREENING MAMMOGRAM FOR BREAST CANCER: ICD-10-CM

## 2025-01-10 DIAGNOSIS — Z12.11 ENCOUNTER FOR SCREENING COLONOSCOPY: ICD-10-CM

## 2025-01-10 DIAGNOSIS — Z00.00 ENCOUNTER FOR ANNUAL WELLNESS EXAM IN MEDICARE PATIENT: Primary | ICD-10-CM

## 2025-01-10 DIAGNOSIS — N18.2 CHRONIC KIDNEY DISEASE, STAGE 2 (MILD): ICD-10-CM

## 2025-01-10 DIAGNOSIS — E66.01 CLASS 2 SEVERE OBESITY DUE TO EXCESS CALORIES WITH SERIOUS COMORBIDITY AND BODY MASS INDEX (BMI) OF 36.0 TO 36.9 IN ADULT: ICD-10-CM

## 2025-01-10 DIAGNOSIS — R60.0 BILATERAL LEG EDEMA: ICD-10-CM

## 2025-01-10 DIAGNOSIS — J45.20 MILD INTERMITTENT ASTHMA WITHOUT COMPLICATION: ICD-10-CM

## 2025-01-10 DIAGNOSIS — D50.8 IRON DEFICIENCY ANEMIA SECONDARY TO INADEQUATE DIETARY IRON INTAKE: ICD-10-CM

## 2025-01-10 PROCEDURE — 99396 PREV VISIT EST AGE 40-64: CPT | Performed by: FAMILY MEDICINE

## 2025-01-10 PROCEDURE — 99214 OFFICE O/P EST MOD 30 MIN: CPT | Performed by: FAMILY MEDICINE

## 2025-01-10 RX ORDER — SPIRONOLACTONE 25 MG/1
25 TABLET ORAL DAILY
Qty: 90 TABLET | Refills: 1 | Status: SHIPPED | OUTPATIENT
Start: 2025-01-10

## 2025-01-10 NOTE — PROGRESS NOTES
"Iman Wiseman presents to [facility].    Chief Complaint: Yearly physical    Subjective     History of Present Illness:  Gout  Pertinent negative symptoms include no abdominal pain, no chest pain, no chills, no cough, no fatigue, no fever, no nausea, no rash, no sore throat and no vomiting.      Patient is in today for yearly physical s/p hysterectomy  GYNE:Dr Arango  [id number] and C section  Hysterectomy 1992  Last Menses:  Last colonoscopy: 2019-polyps, repeat   Last Mammogram: 2024 normal  Last DEXA: 22-NORMAL  Urinary symptoms:NONE, NO INCONTINENCE  Nocturia: NONE  Sexual concerns: NONE  EtOH use: NONE  Tobacco use: NONE  She wears a seatbelt in the car  Drug use: NO  Last eye exam: [facility] YEARLY, 2024-wears glasses  Dental exams every 6 months  IMMUNIZATIONS:  COVID BOOSTER, FLU, PREVNAR 20AND SHINGRIX  Abnormal PAP: never  Denies depression or anxiety  Exercise: walks 2 days a week, walk stairs at work  BMI 36.33-[facility] dietician and she has in the past and \"knows what to do\"    I am also seeing him today for hypertension.  She is overall doing well.  She avoid salt in her diet and is  on spironolactone for lower extremity edema low potassium and hypertension.  Her blood pressure is tearful today.  She tolerates medication well.  She does check blood pressures at home and knows she is to call if blood pressures greater than 140/90.  S    She had concerns about sleep apnea and has seen a sleep specialist and told she has mild sleep apnea.  Weight loss would significantly help.  She has not followed up to get a CPAP set up for her.      She presents with chronic iron deficiency anemia and is taking iron daily.  She tolerates medication well and is due for labs       She presents with vitamin D deficiency and takes 5000 units twice a week and is on B complex for B12 deficiency and is also due for labs    History of abnormal CT chest and , she had " a CT of chest that showed 12 mm pleural-based noncalcified nodule, superior segment, left lower lobe that was stable on f/u CT             Review of Systems:  Review of Systems   Constitutional:  Negative for chills, fatigue and fever.   HENT:  Negative for ear pain, sinus pressure and sore throat.    Eyes:  Negative for blurred vision and double vision.   Respiratory:  Negative for cough, shortness of breath and wheezing.    Cardiovascular:  Negative for chest pain and palpitations.   Gastrointestinal:  Negative for abdominal pain, blood in stool, constipation, diarrhea, nausea and vomiting.   Musculoskeletal:  Positive for gout.   Skin:  Negative for rash.   Neurological:  Negative for dizziness and headache.   Psychiatric/Behavioral:  Negative for sleep disturbance, suicidal ideas and depressed mood. The patient is not nervous/anxious.         Objective     Medications:  Current Outpatient Medications on File Prior to Visit   Medication Sig    allopurinol (ZYLOPRIM) 100 MG tablet Take 1 tablet by mouth Daily.    Cholecalciferol (Vitamin D-3) 125 MCG (5000 UT) tablet Take 1 tablet by mouth Daily.    colchicine 0.6 MG tablet Take 1 tablet by mouth Daily.    FeroSul 325 (65 Fe) MG tablet TAKE 1 TABLET BY MOUTH DAILY WITH BREAKFAST    multivitamin with minerals tablet tablet Take 1 tablet by mouth Daily.    [DISCONTINUED] spironolactone (ALDACTONE) 25 MG tablet Take 1 tablet by mouth Daily.     No current facility-administered medications on file prior to visit.       Vital Signs:   /80   Pulse 69   Wt 102 kg (225 lb)   BMI 36.33 kg/m²       Physical Exam:  Physical Exam  Vitals reviewed. Exam conducted with a chaperone present.   Constitutional:       General: She is not in acute distress.     Appearance: Normal appearance. She is normal weight. She is not ill-appearing.   HENT:      Head: Normocephalic and atraumatic.      Right Ear: Tympanic membrane normal. There is no impacted cerumen.      Left Ear:  Tympanic membrane normal. There is no impacted cerumen.      Mouth/Throat:      Mouth: Mucous membranes are moist.      Pharynx: Oropharynx is clear.   Eyes:      Extraocular Movements: Extraocular movements intact.      Pupils: Pupils are equal, round, and reactive to light.   Neck:      Vascular: No carotid bruit.   Cardiovascular:      Rate and Rhythm: Normal rate and regular rhythm.      Pulses: Normal pulses.      Heart sounds: No murmur heard.  Pulmonary:      Effort: Pulmonary effort is normal.      Breath sounds: No wheezing, rhonchi or rales.   Chest:   Breasts:     Devonte Score is 5.      Right: Normal.      Left: Normal.   Abdominal:      General: Bowel sounds are normal.      Palpations: Abdomen is soft.      Tenderness: There is no abdominal tenderness.      Hernia: There is no hernia in the left inguinal area or right inguinal area.   Genitourinary:     General: Normal vulva.      Pubic Area: No rash.       Labia:         Right: No rash, tenderness, lesion or injury.         Left: No rash, tenderness, lesion or injury.       Urethra: No prolapse, urethral pain, urethral swelling or urethral lesion.      Vagina: Normal.      Uterus: Absent.       Adnexa: Right adnexa normal and left adnexa normal.      Rectum: Guaiac result negative. External hemorrhoid present. No mass, tenderness, anal fissure or internal hemorrhoid. Normal anal tone.   Musculoskeletal:         General: No swelling. Normal range of motion.      Cervical back: No rigidity or tenderness.   Lymphadenopathy:      Cervical: No cervical adenopathy.      Upper Body:      Right upper body: No axillary adenopathy.      Left upper body: No axillary adenopathy.      Lower Body: No right inguinal adenopathy. No left inguinal adenopathy.   Skin:     General: Skin is warm and dry.   Neurological:      General: No focal deficit present.      Mental Status: She is alert and oriented to person, place, and time.      Gait: Gait normal.   Psychiatric:    "      Mood and Affect: Mood normal.         Behavior: Behavior normal.         Thought Content: Thought content normal.         Judgment: Judgment normal.         Result Review   The following data was reviewed by Tiffani Arango MD on 05/18/2023.  Lab Results   Component Value Date    WBC 8.94 06/19/2024    HGB 13.7 06/19/2024    HCT 42.8 06/19/2024    MCV 90.3 06/19/2024     06/19/2024     Lab Results   Component Value Date    GLUCOSE 106 (H) 11/05/2024    BUN 15 11/05/2024    CREATININE 1.03 (H) 11/05/2024    EGFR 62.8 11/05/2024    BCR 14.6 11/05/2024    K 4.2 11/05/2024    CO2 28.1 11/05/2024    CALCIUM 9.7 11/05/2024    ALBUMIN 4.0 06/19/2024    BILITOT 0.5 06/19/2024    AST 19 06/19/2024    ALT 25 06/19/2024     Lab Results   Component Value Date    CHOL 165 05/18/2023    CHLPL 174 12/01/2020    TRIG 96 05/18/2023    HDL 44 05/18/2023     (H) 05/18/2023     Lab Results   Component Value Date    TSH 2.420 01/18/2022     Lab Results   Component Value Date    HGBA1C 5.60 07/15/2021     No results found for: \"PSA\"               Assessment and Plan:       Diagnoses and all orders for this visit:    1. Encounter for annual wellness exam in Medicare patient (Primary)    2. Menopause  Comments:  DEXA is overdue.  Will place order for DEXA to be performed with mammogram  Orders:  -     DEXA Bone Density Axial; Future    3. Encounter for screening colonoscopy  Comments:  Overdue for 5-year follow-up colonoscopy due in July 2024.  Referral placed with Dr. Phillips to repeat screening colonoscopy  Orders:  -     Ambulatory Referral For Screening Colonoscopy    4. Encounter for immunization  Comments:  Recommend Prevnar 20, COVID booster and flu vaccine and she defers    5. Screening mammogram for breast cancer  -     Mammo Screening Digital Tomosynthesis Bilateral With CAD; Future    6. Iron deficiency anemia secondary to inadequate dietary iron intake  Comments:  Stable on iron supplementation.  Due for " follow-up.  Will check labs and adjust Tx plan pending results  Orders:  -     Iron Profile; Future    7. Acute gout, unspecified cause, unspecified site  Comments:  Stable on allopurinol and as needed colchicine for gout flareups.  Will check uric acid levels today  Orders:  -     Uric acid; Future    8. Chronic kidney disease, stage 2 (mild)  Comments:  Overall stable.  She knows she is to minimize NSAID use and drink plenty fluids 64 ounces a day to help kidney function    9. Essential hypertension  Comments:  Blood pressure well-controlled on spironolactone.  She is to work on healthy diet and weight loss and daily exercise  Orders:  -     Comprehensive Metabolic Panel; Future  -     CBC (No Diff); Future  -     Lipid Panel; Future    10. Vitamin D deficiency, unspecified  Comments:  Stable vitamin D supplementation.  Will check labs and adjust Tx plan pending results  Orders:  -     Vitamin D,25-Hydroxy; Future    11. Hypokalemia  Comments:  On spironolactone which will help preserve potassium.  Will check labs    12. Vitamin B deficiency  Comments:  Stable vitamin b supplementation. Will check labs and adjust Tx plan pending results  Orders:  -     Vitamin B12; Future    13. Class 2 severe obesity due to excess calories with serious comorbidity and body mass index (BMI) of 36.0 to 36.9 in adult  Comments:  Counseled on healthy diet and daily exercise.  Offered referral to dietitian and she defers    14. Mild intermittent asthma without complication  Comments:  No acute issues or concerns    15. Bilateral leg edema  -     spironolactone (ALDACTONE) 25 MG tablet; Take 1 tablet by mouth Daily.  Dispense: 90 tablet; Refill: 1    16. Borderline diabetes  Comments:  She has lost a little bit of weight and is working on healthy diet.  Will check a hemoglobin A1c to reeval.  Last hemoglobin A1c was normal at 5.6%  Orders:  -     Hemoglobin A1c; Future            Follow Up   No follow-ups on file.           Medical  History:  Past Medical History:    Acute kidney failure, unspecified    Acute pharyngitis    Anxiety disorder    Chronic kidney disease, stage 2 (mild)    Colon polyp    Cutaneous abscess of abdominal wall    Dyspnea, unspecified    Elevated white blood cell count, unspecified    Essential (primary) hypertension    Family history of diabetes mellitus    Generalized edema    Hypocalcemia    Infection following a procedure, unspecified, subsequent encounter    Iron deficiency anemia    Irritable bowel syndrome with diarrhea    Mild intermittent asthma    Other alopecia areata    Other dorsalgia    Other fatigue    Other iron deficiency anemias    Other long term (current) drug therapy    Other ovarian cyst, left side    Overweight    Shortness of breath    Solitary pulmonary nodule    Unilateral primary osteoarthritis, left knee    Vitamin D deficiency, unspecified     Past Surgical History:     SECTION    CHOLECYSTECTOMY    COLONOSCOPY    ENDOSCOPY    HYSTERECTOMY    right oopherectomy     LAPAROSCOPY WITH LASER    abdominal abscess drainage/post op wound infection with wound vac      Family History   Problem Relation Age of Onset    Hypertension Mother     Ovarian cancer Mother     Diabetes type II Mother     Arthritis Mother     Cancer Mother         uterine or cervix    Diabetes Mother     Hyperlipidemia Mother     Cancer Father     Hypertension Sister     Diabetes Sister     Hyperlipidemia Sister     COPD Sister     Hypertension Brother     Diabetes Brother     Hyperlipidemia Brother     Cancer Brother         prostrate    Vision loss Maternal Aunt     Vision loss Maternal Uncle      Social History     Tobacco Use    Smoking status: Never    Smokeless tobacco: Never   Substance Use Topics    Alcohol use: Not Currently     Comment: social drinker once or twice a year       Health Maintenance Due   Topic Date Due    COVID-19 Vaccine ( season) 2024    BMI FOLLOWUP  2024         Immunization History   Administered Date(s) Administered    COVID-19 (PFIZER) Purple Cap Monovalent 03/22/2021, 04/12/2021    Covid-19 (Pfizer) Gray Cap Monovalent 02/17/2022    Hepatitis A 04/25/2016    Hepatitis B Adult/Adolescent IM 04/25/2016    Tdap 01/30/2012, 03/10/2022       No Known Allergies   Answers submitted by the patient for this visit:  Primary Reason for Visit (Submitted on 1/10/2025)  What is the primary reason for your visit?: Problem Not Listed

## 2025-01-16 ENCOUNTER — LAB (OUTPATIENT)
Dept: LAB | Facility: HOSPITAL | Age: 60
End: 2025-01-16
Payer: COMMERCIAL

## 2025-01-16 DIAGNOSIS — E53.9 VITAMIN B DEFICIENCY: ICD-10-CM

## 2025-01-16 DIAGNOSIS — D50.8 IRON DEFICIENCY ANEMIA SECONDARY TO INADEQUATE DIETARY IRON INTAKE: ICD-10-CM

## 2025-01-16 DIAGNOSIS — I10 ESSENTIAL HYPERTENSION: ICD-10-CM

## 2025-01-16 DIAGNOSIS — M10.9 ACUTE GOUT, UNSPECIFIED CAUSE, UNSPECIFIED SITE: ICD-10-CM

## 2025-01-16 DIAGNOSIS — R73.03 BORDERLINE DIABETES: ICD-10-CM

## 2025-01-16 DIAGNOSIS — E55.9 VITAMIN D DEFICIENCY, UNSPECIFIED: ICD-10-CM

## 2025-01-16 LAB
25(OH)D3 SERPL-MCNC: 34.5 NG/ML (ref 30–100)
ALBUMIN SERPL-MCNC: 4.1 G/DL (ref 3.5–5.2)
ALBUMIN/GLOB SERPL: 1.1 G/DL
ALP SERPL-CCNC: 149 U/L (ref 39–117)
ALT SERPL W P-5'-P-CCNC: 49 U/L (ref 1–33)
ANION GAP SERPL CALCULATED.3IONS-SCNC: 10 MMOL/L (ref 5–15)
AST SERPL-CCNC: 46 U/L (ref 1–32)
BILIRUB SERPL-MCNC: 0.7 MG/DL (ref 0–1.2)
BUN SERPL-MCNC: 14 MG/DL (ref 6–20)
BUN/CREAT SERPL: 13.7 (ref 7–25)
CALCIUM SPEC-SCNC: 9.9 MG/DL (ref 8.6–10.5)
CHLORIDE SERPL-SCNC: 101 MMOL/L (ref 98–107)
CHOLEST SERPL-MCNC: 192 MG/DL (ref 0–200)
CO2 SERPL-SCNC: 29 MMOL/L (ref 22–29)
CREAT SERPL-MCNC: 1.02 MG/DL (ref 0.57–1)
DEPRECATED RDW RBC AUTO: 41 FL (ref 37–54)
EGFRCR SERPLBLD CKD-EPI 2021: 63.5 ML/MIN/1.73
ERYTHROCYTE [DISTWIDTH] IN BLOOD BY AUTOMATED COUNT: 12.1 % (ref 12.3–15.4)
GLOBULIN UR ELPH-MCNC: 3.6 GM/DL
GLUCOSE SERPL-MCNC: 98 MG/DL (ref 65–99)
HBA1C MFR BLD: 5.4 % (ref 4.8–5.6)
HCT VFR BLD AUTO: 43.6 % (ref 34–46.6)
HDLC SERPL-MCNC: 49 MG/DL (ref 40–60)
HGB BLD-MCNC: 13.5 G/DL (ref 12–15.9)
IRON 24H UR-MRATE: 83 MCG/DL (ref 37–145)
IRON SATN MFR SERPL: 26 % (ref 20–50)
LDLC SERPL CALC-MCNC: 128 MG/DL (ref 0–100)
LDLC/HDLC SERPL: 2.59 {RATIO}
MCH RBC QN AUTO: 28.1 PG (ref 26.6–33)
MCHC RBC AUTO-ENTMCNC: 31 G/DL (ref 31.5–35.7)
MCV RBC AUTO: 90.8 FL (ref 79–97)
PLATELET # BLD AUTO: 282 10*3/MM3 (ref 140–450)
PMV BLD AUTO: 10.1 FL (ref 6–12)
POTASSIUM SERPL-SCNC: 4.9 MMOL/L (ref 3.5–5.2)
PROT SERPL-MCNC: 7.7 G/DL (ref 6–8.5)
RBC # BLD AUTO: 4.8 10*6/MM3 (ref 3.77–5.28)
SODIUM SERPL-SCNC: 140 MMOL/L (ref 136–145)
TIBC SERPL-MCNC: 314 MCG/DL (ref 298–536)
TRANSFERRIN SERPL-MCNC: 211 MG/DL (ref 200–360)
TRIGL SERPL-MCNC: 81 MG/DL (ref 0–150)
URATE SERPL-MCNC: 6.8 MG/DL (ref 2.4–5.7)
VIT B12 BLD-MCNC: 419 PG/ML (ref 211–946)
VLDLC SERPL-MCNC: 15 MG/DL (ref 5–40)
WBC NRBC COR # BLD AUTO: 7.64 10*3/MM3 (ref 3.4–10.8)

## 2025-01-16 PROCEDURE — 83036 HEMOGLOBIN GLYCOSYLATED A1C: CPT

## 2025-01-16 PROCEDURE — 80061 LIPID PANEL: CPT

## 2025-01-16 PROCEDURE — 84550 ASSAY OF BLOOD/URIC ACID: CPT

## 2025-01-16 PROCEDURE — 80053 COMPREHEN METABOLIC PANEL: CPT

## 2025-01-16 PROCEDURE — 82306 VITAMIN D 25 HYDROXY: CPT

## 2025-01-16 PROCEDURE — 36415 COLL VENOUS BLD VENIPUNCTURE: CPT

## 2025-01-16 PROCEDURE — 83540 ASSAY OF IRON: CPT

## 2025-01-16 PROCEDURE — 82607 VITAMIN B-12: CPT

## 2025-01-16 PROCEDURE — 84466 ASSAY OF TRANSFERRIN: CPT

## 2025-01-16 PROCEDURE — 85027 COMPLETE CBC AUTOMATED: CPT

## 2025-01-20 DIAGNOSIS — R79.89 ELEVATED LFTS: Primary | ICD-10-CM

## 2025-01-20 RX ORDER — ALLOPURINOL 100 MG/1
200 TABLET ORAL DAILY
Qty: 180 TABLET | Refills: 1 | Status: SHIPPED | OUTPATIENT
Start: 2025-01-20

## 2025-02-04 ENCOUNTER — LAB (OUTPATIENT)
Dept: LAB | Facility: HOSPITAL | Age: 60
End: 2025-02-04
Payer: COMMERCIAL

## 2025-02-04 ENCOUNTER — HOSPITAL ENCOUNTER (OUTPATIENT)
Dept: ULTRASOUND IMAGING | Facility: HOSPITAL | Age: 60
Discharge: HOME | End: 2025-02-04
Payer: COMMERCIAL

## 2025-02-04 DIAGNOSIS — R79.89 ELEVATED LFTS: ICD-10-CM

## 2025-02-04 PROCEDURE — 36415 COLL VENOUS BLD VENIPUNCTURE: CPT

## 2025-02-04 PROCEDURE — 86704 HEP B CORE ANTIBODY TOTAL: CPT

## 2025-02-04 PROCEDURE — 86706 HEP B SURFACE ANTIBODY: CPT

## 2025-02-04 PROCEDURE — 76700 US EXAM ABDOM COMPLETE: CPT

## 2025-02-04 PROCEDURE — 86803 HEPATITIS C AB TEST: CPT

## 2025-02-04 PROCEDURE — 87340 HEPATITIS B SURFACE AG IA: CPT

## 2025-02-06 DIAGNOSIS — E66.812 CLASS 2 OBESITY: Primary | ICD-10-CM

## 2025-02-06 LAB
HBV CORE AB SERPL QL IA: NEGATIVE
HBV SURFACE AB SER QL: NON REACTIVE
HBV SURFACE AG SERPL QL IA: NEGATIVE
HCV AB SERPL QL IA: NORMAL
HCV IGG SERPL QL IA: NON REACTIVE

## 2025-02-07 ENCOUNTER — OFFICE VISIT (OUTPATIENT)
Age: 60
End: 2025-02-07
Payer: COMMERCIAL

## 2025-02-07 VITALS
WEIGHT: 229.8 LBS | HEIGHT: 66 IN | DIASTOLIC BLOOD PRESSURE: 74 MMHG | OXYGEN SATURATION: 97 % | HEART RATE: 80 BPM | BODY MASS INDEX: 36.93 KG/M2 | RESPIRATION RATE: 18 BRPM | TEMPERATURE: 97.5 F | SYSTOLIC BLOOD PRESSURE: 126 MMHG

## 2025-02-07 DIAGNOSIS — K76.0 HEPATIC STEATOSIS: Primary | ICD-10-CM

## 2025-02-07 DIAGNOSIS — R79.89 ELEVATED LFTS: ICD-10-CM

## 2025-02-07 PROCEDURE — 99214 OFFICE O/P EST MOD 30 MIN: CPT

## 2025-02-07 NOTE — PROGRESS NOTES
Chief Complaint     elevated LFTs (New Patient)    Patient or patient representative verbalized consent for the use of Ambient Listening during the visit with  RANDALL Condon for chart documentation. 2/9/2025  08:35 EST      History of Present Illness     Iman Wiseman is a 60 y.o. female who presents to [facility] on referral from Tiffani Arango MD for a gastroenterology evaluation of elevated LFTs.      History of Present Illness  The patient is a 60-year-old female who presents for evaluation of hepatic steatosis.    She recently underwent an ultrasound of her liver on the 4th, which revealed potential mild hepatic steatosis, a diagnosis she has not previously received. She reports no right-sided pain but does experience intermittent tenderness. She has been advised to take milk thistle, consult a dietitian, and pursue weight loss. She reports no alcohol consumption, intravenous drug use, unprofessional tattoos, or history of hepatitis exposure. She has a history of blood transfusion in 1992 following childbirth. She reports no recent weight gain. She reports no upper gastrointestinal symptoms such as nausea, vomiting, dysphagia, unintentional weight loss, or heartburn. She also reports no lower gastrointestinal symptoms such as constipation, diarrhea, hematochezia, melena, or abdominal pain. However, she does experience occasional postprandial pain and variability in stool consistency, which she attributes to dietary factors. She is not concerned about these symptoms at present. She has a personal history of benign colonic polyps, with two noncancerous polyps removed during each of her previous colonoscopies. She is currently due for a repeat colonoscopy, having missed her scheduled appointment in July 2024. She is currently on vitamin B and D supplements.    She was diagnosed with gout approximately 3 months ago and was prescribed allopurinol. She also  takes colchicine as needed for flare-ups, the last of which occurred 3 months ago. She was previously on HCTZ, but this was switched to spironolactone due to potassium levels.    SOCIAL HISTORY  She does not drink alcohol.    FAMILY HISTORY  She does not have a family history of liver disease or colon cancer that she is aware of. Her brother has a history of colon polyps.    MEDICATIONS  Current: allopurinol, spironolactone, vitamin B supplement, vitamin D supplement  As needed: colchicine  Past: HCTZ    Patient's last EGD/Colonoscopy 19 Dr. Phillips at [address]      History      Past Medical History:   Diagnosis Date    Acute kidney failure, unspecified     Acute pharyngitis     Anxiety disorder     Cholelithiasis     removed 15 to 20 yrs ago    Chronic kidney disease, stage 2 (mild)     Colon polyp     Cutaneous abscess of abdominal wall     Dyspnea, unspecified     Elevated white blood cell count, unspecified     Essential (primary) hypertension     Family history of diabetes mellitus     Generalized edema     Hypocalcemia     Infection following a procedure, unspecified, subsequent encounter     Iron deficiency anemia     Irritable bowel syndrome with diarrhea     Mild intermittent asthma     Other alopecia areata     Other dorsalgia     Other fatigue     Other iron deficiency anemias     Other long term (current) drug therapy     Other ovarian cyst, left side     Overweight     Shortness of breath     Solitary pulmonary nodule     Unilateral primary osteoarthritis, left knee     Vitamin D deficiency, unspecified        Past Surgical History:   Procedure Laterality Date     SECTION      CHOLECYSTECTOMY      COLONOSCOPY      ENDOSCOPY  2019    HYSTERECTOMY      right oopherectomy     LAPAROSCOPY WITH LASER      abdominal abscess drainage/post op wound infection with wound vac       Family History   Problem Relation Age of Onset    Hypertension Mother      "Ovarian cancer Mother     Diabetes type II Mother     Arthritis Mother     Cancer Mother         uterine or cervix    Diabetes Mother     Hyperlipidemia Mother     Cancer Father     Hypertension Sister     Diabetes Sister     Hyperlipidemia Sister     COPD Sister     Hypertension Brother     Diabetes Brother     Hyperlipidemia Brother     Colon polyps Brother     Cancer Brother         prostrate    Vision loss Maternal Aunt     Vision loss Maternal Uncle     Colon cancer Neg Hx         Current Medications        Current Outpatient Medications:     allopurinol (ZYLOPRIM) 100 MG tablet, Take 2 tablets by mouth Daily., Disp: 180 tablet, Rfl: 1    Cholecalciferol (Vitamin D-3) 125 MCG (5000 UT) tablet, Take 1 tablet by mouth Daily., Disp: , Rfl:     colchicine 0.6 MG tablet, Take 1 tablet by mouth Daily., Disp: 20 tablet, Rfl: 2    FeroSul 325 (65 Fe) MG tablet, TAKE 1 TABLET BY MOUTH DAILY WITH BREAKFAST, Disp: 90 tablet, Rfl: 0    multivitamin with minerals tablet tablet, Take 1 tablet by mouth Daily., Disp: , Rfl:     spironolactone (ALDACTONE) 25 MG tablet, Take 1 tablet by mouth Daily., Disp: 90 tablet, Rfl: 1     Allergies     No Known Allergies    Social History       Social History     Social History Narrative    Not on file       Immunizations     Immunization:  Immunization History   Administered Date(s) Administered    COVID-19 (PFIZER) Purple Cap Monovalent 03/22/2021, 04/12/2021    Covid-19 (Pfizer) Gray Cap Monovalent 02/17/2022    Hepatitis A 04/25/2016    Hepatitis B Adult/Adolescent IM 04/25/2016    Tdap 01/30/2012, 03/10/2022          Objective     Objective     Vital Signs:   /74 (BP Location: Right arm, Patient Position: Sitting, Cuff Size: Large Adult)   Pulse 80   Temp 97.5 °F (36.4 °C)   Resp 18   Ht 167.6 cm (65.98\")   Wt 104 kg (229 lb 12.8 oz)   SpO2 97%   BMI 37.11 kg/m²       Physical Exam  HENT:      Head: Normocephalic.   Pulmonary:      Effort: Pulmonary effort is normal. " "  Musculoskeletal:         General: Normal range of motion.   Neurological:      General: No focal deficit present.      Mental Status: She is alert.   Psychiatric:         Mood and Affect: Mood normal.                 Results      Result Review :   The following data was reviewed by: RANDALL Condon on 02/07/2025:    Lab Results - Last 18 Months   Lab Units 01/16/25  1125 06/19/24  0902 11/30/23  1258   WBC 10*3/mm3 7.64 8.94 7.56   HEMOGLOBIN g/dL 13.5 13.7 13.4   MCV fL 90.8 90.3 90.6   PLATELETS 10*3/mm3 282 261 261         Lab Results - Last 18 Months   Lab Units 01/16/25  1125 11/05/24  0827 06/19/24  0821   BUN mg/dL 14 15 12   CREATININE mg/dL 1.02* 1.03* 0.89   SODIUM mmol/L 140 144 139   POTASSIUM mmol/L 4.9 4.2 3.1*   CHLORIDE mmol/L 101 108* 100   CO2 mmol/L 29.0 28.1 29.0   GLUCOSE mg/dL 98 106* 133*      No results for input(s): \"PROTIME\", \"INR\", \"PTT\" in the last 92279 hours.  Lab Results - Last 18 Months   Lab Units 01/16/25  1125 06/19/24  0821 11/30/23  1258   AST (SGOT) U/L 46* 19  --    ALT (SGPT) U/L 49* 25  --    ALK PHOS U/L 149* 109  --    BILIRUBIN mg/dL 0.7 0.5  --    TOTAL PROTEIN g/dL 7.7 7.4  --    ALBUMIN g/dL 4.1 4.0 4.1      Lab Results - Last 18 Months   Lab Units 01/16/25  1125 06/19/24  0821   IRON mcg/dL 83 52   TRANSFERRIN mg/dL 211 188*   TIBC mcg/dL 314 280*   VITAMIN B 12 pg/mL 419 327     Lab Results - Last 18 Months   Lab Units 02/04/25  0927   HEP B C TOTAL AB  Negative       US abdomen complete performed on 2/5/2025  Possible mild hepatic steatosis.  No liver lesions or other acute findings evident.  Previous cholecystectomy.      Results  Laboratory Studies  Liver enzymes (AST, ALT, alkaline phosphatase, bilirubin) were elevated.    Imaging  Ultrasound of liver showed possible mild hepatic steatosis.             Assessment and Plan        Assessment and Plan    Diagnoses and all orders for this visit:    1. Hepatic steatosis (Primary)  -     AFP Tumor " Marker; Future  -     Alpha - 1 - Antitrypsin Phenotype; Future  -     JOHN; Future  -     Hepatic Function Panel; Future  -     Hepatitis Panel, Acute; Future  -     Iron Profile; Future  -     Ferritin; Future  -     Copper, Serum; Future  -     Protime-INR; Future  -     Ceruloplasmin; Future  -     Mitochondrial Antibodies, M2; Future  -     Anti-Smooth Muscle Antibody Titer; Future  -     RUBY + PE; Future  -     CBC (No Diff); Future  -     Hepatic Function Panel; Future  -     Protime-INR; Future  -     US Liver; Future  -     HOLLEY Fibrosure Plus; Future    2. Elevated LFTs    3. BMI 37.0-37.9, adult          Assessment & Plan  1. Hepatic steatosis.  The ultrasound results indicate potential mild hepatic steatosis. Liver function tests (AST, ALT, alkaline phosphatase, and bilirubin) were within normal limits in June 2024 but have since shown elevation. The most common cause of fatty liver is diet and being overweight. It was explained that fatty liver can progress to fibrosis and eventually cirrhosis if untreated. She has been informed that Tylenol is safe for use. She has been advised to reduce intake of sugars, carbohydrates, and greasy foods, and to increase physical activity with a goal of losing 10% of her body weight over the next year. She has been provided with information on fatty liver and dietary recommendations. She has been encouraged to discuss her medication regimen with her primary care physician. Laboratory tests will be ordered to rule out autoimmune hepatitis and hereditary conditions. A HOLLEY FibroSure test will be conducted to assess the level of steatosis and fibrosis, for which she needs to be fasting. An ultrasound will be scheduled prior to her next visit.    2. Gout.  She was diagnosed with gout about 3 months ago and is currently on allopurinol. She takes colchicine only during flare-ups. It was discussed that allopurinol can cause liver injury in rare cases. She has been advised to  monitor for any signs of liver issues and to discuss alternative medications with her primary care physician if necessary.    3. Colon polyps.  She has a history of colon polyps, with two noncancerous polyps removed during each of her previous colonoscopies. She missed her scheduled colonoscopy in July 2024 and plans to reschedule with Dr. Casey Jose at [facility]. She has been advised to ensure this follow-up is completed, especially given her history of colonic polyps.    Follow-up  The patient will follow up in 6 months.    PROCEDURE  Colonoscopy in July 2019.      * Surgery not found *      Follow Up        Follow Up   Return in about 6 months (around 8/7/2025) for Fatty Liver Disease.    Hepatic lab workup ordered to determine cause of fatty liver. Differential diagnosis include but are not limited to viral hepatitis, autoimmune hepatitis, NAFLD, toxic hepatitis, vascular causes such as Budd Chiari or CHF, or hereditary causes such as hemochromatosis, alpha 1 antitrypsin deficiency, or Feng disease. Follow up Liver US  and Labs in 6 months. Will order HOLLEY Fibrosure as noninvasive testing for fibrosis and fatty liver.     Advised patient to maintain a healthy lifestyle: weight loss of 10%, cutting back on carbs, sugars, and fried fatty foods, limiting intake of soda and sugary drinks, and abstain from alcohol. Recommend 2 to 3 cups of black coffee a day. Advise patient to go on daily walks with 10,000 steps daily (as recommended for patients with NAFLD/HOLLEY).     Patient was given instructions and counseling regarding her condition or for health maintenance advice. Please see specific information pulled into the AVS if appropriate.

## 2025-02-20 ENCOUNTER — LAB (OUTPATIENT)
Dept: LAB | Facility: HOSPITAL | Age: 60
End: 2025-02-20
Payer: COMMERCIAL

## 2025-02-20 DIAGNOSIS — K76.0 HEPATIC STEATOSIS: ICD-10-CM

## 2025-02-20 LAB
ALBUMIN SERPL-MCNC: 3.9 G/DL (ref 3.5–5.2)
ALP SERPL-CCNC: 131 U/L (ref 39–117)
ALPHA-FETOPROTEIN: 3.57 NG/ML (ref 0–8.3)
ALT SERPL W P-5'-P-CCNC: 24 U/L (ref 1–33)
AST SERPL-CCNC: 22 U/L (ref 1–32)
BILIRUB CONJ SERPL-MCNC: 0.1 MG/DL (ref 0–0.3)
BILIRUB INDIRECT SERPL-MCNC: 0.2 MG/DL
BILIRUB SERPL-MCNC: 0.3 MG/DL (ref 0–1.2)
CERULOPLASMIN SERPL-MCNC: 29 MG/DL (ref 19–39)
FERRITIN SERPL-MCNC: 658 NG/ML (ref 13–150)
HAV IGM SERPL QL IA: NORMAL
HBV CORE IGM SERPL QL IA: NORMAL
HBV SURFACE AG SERPL QL IA: NORMAL
HCV AB SER QL: NORMAL
INR PPP: 0.97 (ref 0.86–1.15)
IRON 24H UR-MRATE: 69 MCG/DL (ref 37–145)
IRON SATN MFR SERPL: 23 % (ref 20–50)
PROT SERPL-MCNC: 7.4 G/DL (ref 6–8.5)
PROTHROMBIN TIME: 13.3 SECONDS (ref 11.8–14.9)
TIBC SERPL-MCNC: 301 MCG/DL (ref 298–536)
TRANSFERRIN SERPL-MCNC: 202 MG/DL (ref 200–360)

## 2025-02-20 PROCEDURE — 86334 IMMUNOFIX E-PHORESIS SERUM: CPT

## 2025-02-20 PROCEDURE — 36415 COLL VENOUS BLD VENIPUNCTURE: CPT

## 2025-02-20 PROCEDURE — 80076 HEPATIC FUNCTION PANEL: CPT

## 2025-02-20 PROCEDURE — 82465 ASSAY BLD/SERUM CHOLESTEROL: CPT

## 2025-02-20 PROCEDURE — 85610 PROTHROMBIN TIME: CPT

## 2025-02-20 PROCEDURE — 82390 ASSAY OF CERULOPLASMIN: CPT

## 2025-02-20 PROCEDURE — 83540 ASSAY OF IRON: CPT

## 2025-02-20 PROCEDURE — 86015 ACTIN ANTIBODY EACH: CPT

## 2025-02-20 PROCEDURE — 82104 ALPHA-1-ANTITRYPSIN PHENO: CPT

## 2025-02-20 PROCEDURE — 82172 ASSAY OF APOLIPOPROTEIN: CPT

## 2025-02-20 PROCEDURE — 82977 ASSAY OF GGT: CPT

## 2025-02-20 PROCEDURE — 84466 ASSAY OF TRANSFERRIN: CPT

## 2025-02-20 PROCEDURE — 84478 ASSAY OF TRIGLYCERIDES: CPT

## 2025-02-20 PROCEDURE — 83883 ASSAY NEPHELOMETRY NOT SPEC: CPT

## 2025-02-20 PROCEDURE — 82103 ALPHA-1-ANTITRYPSIN TOTAL: CPT

## 2025-02-20 PROCEDURE — 82947 ASSAY GLUCOSE BLOOD QUANT: CPT

## 2025-02-20 PROCEDURE — 84155 ASSAY OF PROTEIN SERUM: CPT

## 2025-02-20 PROCEDURE — 82525 ASSAY OF COPPER: CPT

## 2025-02-20 PROCEDURE — 82784 ASSAY IGA/IGD/IGG/IGM EACH: CPT

## 2025-02-20 PROCEDURE — 86038 ANTINUCLEAR ANTIBODIES: CPT

## 2025-02-20 PROCEDURE — 84165 PROTEIN E-PHORESIS SERUM: CPT

## 2025-02-20 PROCEDURE — 83010 ASSAY OF HAPTOGLOBIN QUANT: CPT

## 2025-02-20 PROCEDURE — 80074 ACUTE HEPATITIS PANEL: CPT

## 2025-02-20 PROCEDURE — 82728 ASSAY OF FERRITIN: CPT

## 2025-02-20 PROCEDURE — 86381 MITOCHONDRIAL ANTIBODY EACH: CPT

## 2025-02-20 PROCEDURE — 82105 ALPHA-FETOPROTEIN SERUM: CPT

## 2025-02-23 LAB
MITOCHONDRIA M2 IGG SER-ACNC: <20 UNITS (ref 0–20)
SMA IGG SER-ACNC: 6 UNITS (ref 0–19)

## 2025-02-24 LAB — ANA SER QL: NEGATIVE

## 2025-02-25 LAB
A2 MACROGLOB SERPL-MCNC: 166 MG/DL (ref 110–276)
ALBUMIN SERPL ELPH-MCNC: 3.4 G/DL (ref 2.9–4.4)
ALBUMIN/GLOB SERPL: 1.1 {RATIO} (ref 0.7–1.7)
ALPHA1 GLOB SERPL ELPH-MCNC: 0.3 G/DL (ref 0–0.4)
ALPHA2 GLOB SERPL ELPH-MCNC: 0.7 G/DL (ref 0.4–1)
ALT SERPL W P-5'-P-CCNC: 28 IU/L (ref 0–40)
APO A-I SERPL-MCNC: 137 MG/DL (ref 116–209)
AST SERPL W P-5'-P-CCNC: 20 IU/L (ref 0–40)
B-GLOBULIN SERPL ELPH-MCNC: 1.3 G/DL (ref 0.7–1.3)
BILIRUB SERPL-MCNC: 0.2 MG/DL (ref 0–1.2)
CHOLEST SERPL-MCNC: 185 MG/DL (ref 100–199)
COPPER SERPL-MCNC: 112 UG/DL (ref 80–158)
FIBROSIS SCORING:: ABNORMAL
FIBROSIS STAGE SERPL QL: ABNORMAL
GAMMA GLOB SERPL ELPH-MCNC: 1.1 G/DL (ref 0.4–1.8)
GGT SERPL-CCNC: 10 IU/L (ref 0–60)
GLOBULIN SER-MCNC: 3.4 G/DL (ref 2.2–3.9)
GLUCOSE SERPL-MCNC: 91 MG/DL (ref 70–99)
HAPTOGLOB SERPL-MCNC: 185 MG/DL (ref 33–346)
IGA SERPL-MCNC: 317 MG/DL (ref 87–352)
IGG SERPL-MCNC: 1320 MG/DL (ref 586–1602)
IGM SERPL-MCNC: 89 MG/DL (ref 26–217)
INTERPRETATION SERPL IEP-IMP: NORMAL
LABORATORY COMMENT REPORT: ABNORMAL
LABORATORY COMMENT REPORT: NORMAL
LIVER FIBR SCORE SERPL CALC.FIBROSURE: 0.05 (ref 0–0.21)
LIVER STEATOSIS GRADE SERPL QL: ABNORMAL
LIVER STEATOSIS SCORE SERPL: 0.43 (ref 0–0.4)
M PROTEIN SERPL ELPH-MCNC: NORMAL G/DL
NASH GRADE SERPL QL: ABNORMAL
NASH INTERPRETATION SERPL-IMP: ABNORMAL
NASH SCORE SERPL: 0.27 (ref 0–0.25)
NASH SCORING: ABNORMAL
PROT SERPL-MCNC: 6.8 G/DL (ref 6–8.5)
STEATOSIS SCORING: ABNORMAL
TEST PERFORMANCE INFO SPEC: ABNORMAL
TEST PERFORMANCE INFO SPEC: ABNORMAL
TRIGL SERPL-MCNC: 107 MG/DL (ref 0–149)

## 2025-02-27 LAB
A1AT PHENOTYP SERPL IFE: NORMAL
A1AT SERPL-MCNC: 158 MG/DL (ref 101–187)

## 2025-03-03 ENCOUNTER — TELEPHONE (OUTPATIENT)
Age: 60
End: 2025-03-03
Payer: COMMERCIAL

## 2025-03-03 NOTE — TELEPHONE ENCOUNTER
----- Message from Kaleigh Conner sent at 2/28/2025  9:40 PM EST -----  I have reviewed the patient's most recent liver work-up which is normal ruling out underlying hepatitis, autoimmune, and genetic cause for elevated liver enzymes. Elevated liver enzyme and fatty liver on ultrasound is likely due to diet. Advise patient to maintain a healthy lifestyle: weight loss of 10%, cutting back on carbs, sugars, and fried fatty foods, limiting intake of soda and sugary drinks, and abstain from alcohol. Ferritin is elevated but is nonspecific and can be elevated in inflammatory processes. ALT and AST are now normal. Alkaline phosphatase remains elevated but improved from 149 to 131.    I have reviewed the patient's most recent HOLLEY Fibrosure.  The patient's liver stiffness score is consistent with F0 no fibrosis, this is on a scale of F0 being normal and F4 consistent with cirrhosis. HOLLEY FIbrosure also indicates mild fatty infiltration of the liver. HOLLEY grade is N1 - Mild HOLLEY. Continue previously discussed recommendations of dietary modifications and weight loss.

## 2025-03-06 ENCOUNTER — TELEPHONE (OUTPATIENT)
Dept: GASTROENTEROLOGY | Facility: CLINIC | Age: 60
End: 2025-03-06
Payer: COMMERCIAL

## 2025-05-30 DIAGNOSIS — D50.8 IRON DEFICIENCY ANEMIA SECONDARY TO INADEQUATE DIETARY IRON INTAKE: ICD-10-CM

## 2025-05-30 NOTE — TELEPHONE ENCOUNTER
"Caller: Iman Wiseman \"Esther\"    Relationship: Self    Best call back number: [phone]     Requested Prescriptions:   Requested Prescriptions     Pending Prescriptions Disp Refills    ferrous sulfate (FeroSul) 325 (65 FE) MG tablet 90 tablet 0     Sig: Take 1 tablet by mouth Daily With Breakfast.        Pharmacy where request should be sent: [facility] [id number] [address] [place] [facility] - [phone]  - [phone]     Last office visit with prescribing clinician: 7/1/2024   Last telemedicine visit with prescribing clinician: 1/10/2025   Next office visit with prescribing clinician: Visit date not found     Does the patient have less than a 3 day supply:  [x] Yes  [] No    Cindy Gaffney Rep   05/30/25 16:42 EDT     "

## 2025-06-02 RX ORDER — FERROUS SULFATE 325(65) MG
1 TABLET ORAL
Qty: 90 TABLET | Refills: 0 | Status: SHIPPED | OUTPATIENT
Start: 2025-06-02

## 2025-07-08 ENCOUNTER — PREP FOR SURGERY (OUTPATIENT)
Dept: SURGERY | Facility: SURGERY CENTER | Age: 60
End: 2025-07-08
Payer: COMMERCIAL

## 2025-07-08 DIAGNOSIS — Z12.11 ENCOUNTER FOR SCREENING FOR MALIGNANT NEOPLASM OF COLON: Primary | ICD-10-CM

## 2025-07-08 RX ORDER — SODIUM CHLORIDE 0.9 % (FLUSH) 0.9 %
10 SYRINGE (ML) INJECTION AS NEEDED
OUTPATIENT
Start: 2025-07-08

## 2025-07-08 RX ORDER — SODIUM CHLORIDE 0.9 % (FLUSH) 0.9 %
3 SYRINGE (ML) INJECTION EVERY 12 HOURS SCHEDULED
OUTPATIENT
Start: 2025-07-08

## 2025-07-08 RX ORDER — SODIUM CHLORIDE, SODIUM LACTATE, POTASSIUM CHLORIDE, CALCIUM CHLORIDE 600; 310; 30; 20 MG/100ML; MG/100ML; MG/100ML; MG/100ML
30 INJECTION, SOLUTION INTRAVENOUS CONTINUOUS PRN
OUTPATIENT
Start: 2025-07-08 | End: 2025-07-08

## 2025-07-17 ENCOUNTER — HOSPITAL ENCOUNTER (OUTPATIENT)
Dept: BONE DENSITY | Facility: HOSPITAL | Age: 60
Discharge: HOME | End: 2025-07-17
Payer: COMMERCIAL

## 2025-07-17 ENCOUNTER — HOSPITAL ENCOUNTER (OUTPATIENT)
Dept: MAMMOGRAPHY | Facility: HOSPITAL | Age: 60
Discharge: HOME | End: 2025-07-17
Payer: COMMERCIAL

## 2025-07-17 DIAGNOSIS — Z12.31 SCREENING MAMMOGRAM FOR BREAST CANCER: ICD-10-CM

## 2025-07-17 DIAGNOSIS — Z78.0 MENOPAUSE: ICD-10-CM

## 2025-07-17 PROCEDURE — 77080 DXA BONE DENSITY AXIAL: CPT

## 2025-07-17 PROCEDURE — 77067 SCR MAMMO BI INCL CAD: CPT

## 2025-07-17 PROCEDURE — 77063 BREAST TOMOSYNTHESIS BI: CPT

## 2025-08-07 ENCOUNTER — TELEPHONE (OUTPATIENT)
Age: 60
End: 2025-08-07

## 2025-08-07 ENCOUNTER — HOSPITAL ENCOUNTER (OUTPATIENT)
Dept: ULTRASOUND IMAGING | Facility: HOSPITAL | Age: 60
Discharge: HOME | End: 2025-08-07
Payer: COMMERCIAL

## 2025-08-07 DIAGNOSIS — K76.0 HEPATIC STEATOSIS: ICD-10-CM

## 2025-08-07 PROCEDURE — 76705 ECHO EXAM OF ABDOMEN: CPT

## 2025-08-25 ENCOUNTER — TELEPHONE (OUTPATIENT)
Dept: GASTROENTEROLOGY | Facility: CLINIC | Age: 60
End: 2025-08-25
Payer: COMMERCIAL